# Patient Record
Sex: FEMALE | Race: WHITE | NOT HISPANIC OR LATINO | Employment: UNEMPLOYED | URBAN - METROPOLITAN AREA
[De-identification: names, ages, dates, MRNs, and addresses within clinical notes are randomized per-mention and may not be internally consistent; named-entity substitution may affect disease eponyms.]

---

## 2021-05-13 LAB
ABO + RH BLD: NORMAL
C TRACH RRNA SPEC QL PROBE: NEGATIVE
HBV SURFACE AG SERPL QL IA: NEGATIVE
HCT VFR BLD AUTO: 43.1 % (ref 36–46)
HGB BLD-MCNC: 14.5 G/DL (ref 12–16)
HIV 1+2 AB+HIV1 P24 AG SERPL QL IA: NORMAL
INDIRECT COOMBS: NEGATIVE
N GONORRHOEAE, AMPLIFIED DNA: NEGATIVE
PLATELET # BLD AUTO: 269 K/UL (ref 150–399)
RPR: NORMAL
RUBELLA IMMUNE STATUS: NORMAL

## 2021-06-01 ENCOUNTER — ROUTINE PRENATAL (OUTPATIENT)
Dept: OBSTETRICS AND GYNECOLOGY | Facility: CLINIC | Age: 26
End: 2021-06-01
Payer: MEDICAID

## 2021-06-01 VITALS
WEIGHT: 194.19 LBS | SYSTOLIC BLOOD PRESSURE: 135 MMHG | DIASTOLIC BLOOD PRESSURE: 72 MMHG | HEIGHT: 66 IN | BODY MASS INDEX: 31.21 KG/M2

## 2021-06-01 DIAGNOSIS — Z34.81 ENCOUNTER FOR SUPERVISION OF NORMAL PREGNANCY IN MULTIGRAVIDA IN FIRST TRIMESTER: Primary | ICD-10-CM

## 2021-06-01 DIAGNOSIS — N89.8 VAGINAL DISCHARGE: ICD-10-CM

## 2021-06-01 DIAGNOSIS — R30.0 DYSURIA DURING PREGNANCY IN FIRST TRIMESTER: ICD-10-CM

## 2021-06-01 DIAGNOSIS — R30.0 DYSURIA: ICD-10-CM

## 2021-06-01 DIAGNOSIS — Z34.90 PREGNANCY, UNSPECIFIED GESTATIONAL AGE: ICD-10-CM

## 2021-06-01 DIAGNOSIS — O26.891 DYSURIA DURING PREGNANCY IN FIRST TRIMESTER: ICD-10-CM

## 2021-06-01 LAB
EXT MATERNIT21: NORMAL
GARDNERELLA VAGINALIS: NEGATIVE
OTHER MICROSC. OBSERVATIONS: NORMAL
POC BACTERIAL VAGINOSIS: NEGATIVE
POC CLUE CELLS: NEGATIVE
TRICHOMONAS, POC: NEGATIVE
YEAST WET PREP: NEGATIVE

## 2021-06-01 PROCEDURE — 99213 PR OFFICE/OUTPT VISIT, EST, LEVL III, 20-29 MIN: ICD-10-PCS | Mod: 25,TH,S$GLB, | Performed by: NURSE PRACTITIONER

## 2021-06-01 PROCEDURE — 87210 SMEAR WET MOUNT SALINE/INK: CPT | Mod: QW,S$GLB,, | Performed by: NURSE PRACTITIONER

## 2021-06-01 PROCEDURE — 99213 OFFICE O/P EST LOW 20 MIN: CPT | Mod: 25,TH,S$GLB, | Performed by: NURSE PRACTITIONER

## 2021-06-01 PROCEDURE — 87210 POCT WET PREP: ICD-10-PCS | Mod: QW,S$GLB,, | Performed by: NURSE PRACTITIONER

## 2021-06-01 RX ORDER — ALBUTEROL SULFATE 90 UG/1
AEROSOL, METERED RESPIRATORY (INHALATION)
COMMUNITY
Start: 2021-05-13

## 2021-06-15 ENCOUNTER — ROUTINE PRENATAL (OUTPATIENT)
Dept: OBSTETRICS AND GYNECOLOGY | Facility: CLINIC | Age: 26
End: 2021-06-15
Payer: MEDICAID

## 2021-06-15 VITALS
BODY MASS INDEX: 31.17 KG/M2 | DIASTOLIC BLOOD PRESSURE: 71 MMHG | SYSTOLIC BLOOD PRESSURE: 129 MMHG | WEIGHT: 193.13 LBS

## 2021-06-15 DIAGNOSIS — Z34.81 ENCOUNTER FOR SUPERVISION OF NORMAL PREGNANCY IN MULTIGRAVIDA IN FIRST TRIMESTER: Primary | ICD-10-CM

## 2021-06-15 PROBLEM — Z98.891 HISTORY OF C-SECTION: Status: ACTIVE | Noted: 2021-06-15

## 2021-06-15 PROCEDURE — 99212 OFFICE O/P EST SF 10 MIN: CPT | Mod: TH,S$GLB,, | Performed by: NURSE PRACTITIONER

## 2021-06-15 PROCEDURE — 99212 PR OFFICE/OUTPT VISIT, EST, LEVL II, 10-19 MIN: ICD-10-PCS | Mod: TH,S$GLB,, | Performed by: NURSE PRACTITIONER

## 2021-06-17 ENCOUNTER — PATIENT MESSAGE (OUTPATIENT)
Dept: OBSTETRICS AND GYNECOLOGY | Facility: CLINIC | Age: 26
End: 2021-06-17

## 2021-06-18 LAB
AMORPH URATE CRY URNS QL MICRO: NEGATIVE
BACTERIA #/AREA URNS HPF: NEGATIVE /[HPF]
BILIRUB UR QL STRIP: NEGATIVE
CHLAMYDIA: NEGATIVE
CLARITY UR: CLEAR
COLOR UR: YELLOW
EPITHELIAL CELLS: NORMAL
GLUCOSE (UA): NEGATIVE MG/DL
GONORRHEA: NEGATIVE
KETONES UR QL STRIP: NEGATIVE MG/DL
LEUKOCYTE ESTERASE UR QL STRIP: NEGATIVE
MUCOUS THREADS URNS QL MICRO: NEGATIVE
NITRITE UR QL STRIP: NEGATIVE
OCCULT BLOOD: NEGATIVE
PH, URINE: 6 (ref 5–7.5)
PROT UR QL STRIP: NEGATIVE MG/DL
RBC/HPF: NEGATIVE
SOURCE: NORMAL
SP GR UR STRIP: 1.01 (ref 1–1.03)
URINE CULTURE, ROUTINE: NORMAL
UROBILINOGEN, URINE: NORMAL E.U./DL (ref 0–1)
WBC/HPF: NORMAL

## 2021-06-22 ENCOUNTER — ROUTINE PRENATAL (OUTPATIENT)
Dept: OBSTETRICS AND GYNECOLOGY | Facility: CLINIC | Age: 26
End: 2021-06-22
Payer: MEDICAID

## 2021-06-22 VITALS
WEIGHT: 195.25 LBS | DIASTOLIC BLOOD PRESSURE: 70 MMHG | SYSTOLIC BLOOD PRESSURE: 123 MMHG | BODY MASS INDEX: 31.51 KG/M2

## 2021-06-22 DIAGNOSIS — Z34.81 ENCOUNTER FOR SUPERVISION OF NORMAL PREGNANCY IN MULTIGRAVIDA IN FIRST TRIMESTER: Primary | ICD-10-CM

## 2021-06-22 PROCEDURE — 99212 OFFICE O/P EST SF 10 MIN: CPT | Mod: TH,S$GLB,, | Performed by: NURSE PRACTITIONER

## 2021-06-22 PROCEDURE — 99212 PR OFFICE/OUTPT VISIT, EST, LEVL II, 10-19 MIN: ICD-10-PCS | Mod: TH,S$GLB,, | Performed by: NURSE PRACTITIONER

## 2021-07-20 ENCOUNTER — ROUTINE PRENATAL (OUTPATIENT)
Dept: OBSTETRICS AND GYNECOLOGY | Facility: CLINIC | Age: 26
End: 2021-07-20
Payer: MEDICAID

## 2021-07-20 VITALS
WEIGHT: 194.38 LBS | SYSTOLIC BLOOD PRESSURE: 134 MMHG | HEART RATE: 74 BPM | DIASTOLIC BLOOD PRESSURE: 78 MMHG | BODY MASS INDEX: 31.37 KG/M2

## 2021-07-20 DIAGNOSIS — Z34.82 ENCOUNTER FOR SUPERVISION OF NORMAL PREGNANCY IN MULTIGRAVIDA IN SECOND TRIMESTER: Primary | ICD-10-CM

## 2021-07-20 PROCEDURE — 99213 PR OFFICE/OUTPT VISIT, EST, LEVL III, 20-29 MIN: ICD-10-PCS | Mod: TH,S$GLB,, | Performed by: NURSE PRACTITIONER

## 2021-07-20 PROCEDURE — 99213 OFFICE O/P EST LOW 20 MIN: CPT | Mod: TH,S$GLB,, | Performed by: NURSE PRACTITIONER

## 2021-07-27 ENCOUNTER — PATIENT MESSAGE (OUTPATIENT)
Dept: OBSTETRICS AND GYNECOLOGY | Facility: CLINIC | Age: 26
End: 2021-07-27

## 2021-08-02 ENCOUNTER — PATIENT MESSAGE (OUTPATIENT)
Dept: OBSTETRICS AND GYNECOLOGY | Facility: CLINIC | Age: 26
End: 2021-08-02

## 2021-08-19 ENCOUNTER — ROUTINE PRENATAL (OUTPATIENT)
Dept: OBSTETRICS AND GYNECOLOGY | Facility: CLINIC | Age: 26
End: 2021-08-19
Payer: MEDICAID

## 2021-08-19 ENCOUNTER — PROCEDURE VISIT (OUTPATIENT)
Dept: OBSTETRICS AND GYNECOLOGY | Facility: CLINIC | Age: 26
End: 2021-08-19
Payer: MEDICAID

## 2021-08-19 VITALS
DIASTOLIC BLOOD PRESSURE: 65 MMHG | HEART RATE: 92 BPM | BODY MASS INDEX: 32.02 KG/M2 | SYSTOLIC BLOOD PRESSURE: 124 MMHG | WEIGHT: 198.38 LBS

## 2021-08-19 DIAGNOSIS — Z34.82 ENCOUNTER FOR SUPERVISION OF NORMAL PREGNANCY IN MULTIGRAVIDA IN SECOND TRIMESTER: Primary | ICD-10-CM

## 2021-08-19 DIAGNOSIS — Z34.82 ENCOUNTER FOR SUPERVISION OF NORMAL PREGNANCY IN MULTIGRAVIDA IN SECOND TRIMESTER: ICD-10-CM

## 2021-08-19 PROCEDURE — 99213 OFFICE O/P EST LOW 20 MIN: CPT | Mod: TH,25,S$GLB, | Performed by: NURSE PRACTITIONER

## 2021-08-19 PROCEDURE — 99213 PR OFFICE/OUTPT VISIT, EST, LEVL III, 20-29 MIN: ICD-10-PCS | Mod: TH,25,S$GLB, | Performed by: NURSE PRACTITIONER

## 2021-08-19 PROCEDURE — 76805 PR US, OB 14+WKS, TRANSABD, SINGLE GESTATION: ICD-10-PCS | Mod: S$GLB,,, | Performed by: STUDENT IN AN ORGANIZED HEALTH CARE EDUCATION/TRAINING PROGRAM

## 2021-08-19 PROCEDURE — 76805 OB US >/= 14 WKS SNGL FETUS: CPT | Mod: S$GLB,,, | Performed by: STUDENT IN AN ORGANIZED HEALTH CARE EDUCATION/TRAINING PROGRAM

## 2021-09-09 ENCOUNTER — PATIENT MESSAGE (OUTPATIENT)
Dept: OBSTETRICS AND GYNECOLOGY | Facility: CLINIC | Age: 26
End: 2021-09-09

## 2021-09-16 ENCOUNTER — ROUTINE PRENATAL (OUTPATIENT)
Dept: OBSTETRICS AND GYNECOLOGY | Facility: CLINIC | Age: 26
End: 2021-09-16
Payer: MEDICAID

## 2021-09-16 ENCOUNTER — PROCEDURE VISIT (OUTPATIENT)
Dept: OBSTETRICS AND GYNECOLOGY | Facility: CLINIC | Age: 26
End: 2021-09-16
Payer: MEDICAID

## 2021-09-16 VITALS
BODY MASS INDEX: 32.6 KG/M2 | WEIGHT: 202 LBS | DIASTOLIC BLOOD PRESSURE: 84 MMHG | HEART RATE: 90 BPM | SYSTOLIC BLOOD PRESSURE: 137 MMHG

## 2021-09-16 DIAGNOSIS — Z34.82 ENCOUNTER FOR SUPERVISION OF OTHER NORMAL PREGNANCY IN SECOND TRIMESTER: Primary | ICD-10-CM

## 2021-09-16 DIAGNOSIS — Z3A.26 26 WEEKS GESTATION OF PREGNANCY: ICD-10-CM

## 2021-09-16 DIAGNOSIS — Z34.82 ENCOUNTER FOR SUPERVISION OF NORMAL PREGNANCY IN MULTIGRAVIDA IN SECOND TRIMESTER: ICD-10-CM

## 2021-09-16 PROBLEM — Z34.92 ENCOUNTER FOR SUPERVISION OF NORMAL PREGNANCY IN SECOND TRIMESTER: Status: ACTIVE | Noted: 2021-09-16

## 2021-09-16 PROCEDURE — 99213 PR OFFICE/OUTPT VISIT, EST, LEVL III, 20-29 MIN: ICD-10-PCS | Mod: TH,25,S$GLB, | Performed by: STUDENT IN AN ORGANIZED HEALTH CARE EDUCATION/TRAINING PROGRAM

## 2021-09-16 PROCEDURE — 76805 PR US, OB 14+WKS, TRANSABD, SINGLE GESTATION: ICD-10-PCS | Mod: S$GLB,,, | Performed by: STUDENT IN AN ORGANIZED HEALTH CARE EDUCATION/TRAINING PROGRAM

## 2021-09-16 PROCEDURE — 76805 OB US >/= 14 WKS SNGL FETUS: CPT | Mod: S$GLB,,, | Performed by: STUDENT IN AN ORGANIZED HEALTH CARE EDUCATION/TRAINING PROGRAM

## 2021-09-16 PROCEDURE — 99213 OFFICE O/P EST LOW 20 MIN: CPT | Mod: TH,25,S$GLB, | Performed by: STUDENT IN AN ORGANIZED HEALTH CARE EDUCATION/TRAINING PROGRAM

## 2021-09-21 ENCOUNTER — PATIENT MESSAGE (OUTPATIENT)
Dept: OBSTETRICS AND GYNECOLOGY | Facility: CLINIC | Age: 26
End: 2021-09-21

## 2021-09-30 ENCOUNTER — ROUTINE PRENATAL (OUTPATIENT)
Dept: OBSTETRICS AND GYNECOLOGY | Facility: CLINIC | Age: 26
End: 2021-09-30
Payer: MEDICAID

## 2021-09-30 ENCOUNTER — PATIENT MESSAGE (OUTPATIENT)
Dept: OBSTETRICS AND GYNECOLOGY | Facility: CLINIC | Age: 26
End: 2021-09-30

## 2021-09-30 VITALS
SYSTOLIC BLOOD PRESSURE: 127 MMHG | HEART RATE: 101 BPM | WEIGHT: 208 LBS | BODY MASS INDEX: 33.57 KG/M2 | DIASTOLIC BLOOD PRESSURE: 77 MMHG

## 2021-09-30 DIAGNOSIS — R30.0 DYSURIA: ICD-10-CM

## 2021-09-30 DIAGNOSIS — Z34.83 ENCOUNTER FOR SUPERVISION OF NORMAL PREGNANCY IN MULTIGRAVIDA IN THIRD TRIMESTER: Primary | ICD-10-CM

## 2021-09-30 LAB
AMORPH URATE CRY URNS QL MICRO: NEGATIVE
BACTERIA #/AREA URNS HPF: ABNORMAL /[HPF]
BILIRUB UR QL STRIP: NEGATIVE
CLARITY UR: ABNORMAL
COLOR UR: YELLOW
EPITHELIAL CELLS: ABNORMAL
GLUCOSE (UA): NEGATIVE MG/DL
KETONES UR QL STRIP: NEGATIVE MG/DL
LEUKOCYTE ESTERASE UR QL STRIP: ABNORMAL
MUCOUS THREADS URNS QL MICRO: ABNORMAL
NITRITE UR QL STRIP: NEGATIVE
OCCULT BLOOD: ABNORMAL
PH, URINE: 6.5 (ref 5–7.5)
PROT UR QL STRIP: NEGATIVE MG/DL
RBC/HPF: ABNORMAL
SP GR UR STRIP: 1.01 (ref 1–1.03)
UROBILINOGEN, URINE: NORMAL E.U./DL (ref 0–1)
WBC/HPF: ABNORMAL

## 2021-09-30 PROCEDURE — 90471 TDAP VACCINE GREATER THAN OR EQUAL TO 7YO IM: ICD-10-PCS | Mod: S$GLB,,, | Performed by: NURSE PRACTITIONER

## 2021-09-30 PROCEDURE — 90715 TDAP VACCINE GREATER THAN OR EQUAL TO 7YO IM: ICD-10-PCS | Mod: S$GLB,,, | Performed by: NURSE PRACTITIONER

## 2021-09-30 PROCEDURE — 90471 IMMUNIZATION ADMIN: CPT | Mod: S$GLB,,, | Performed by: NURSE PRACTITIONER

## 2021-09-30 PROCEDURE — 90715 TDAP VACCINE 7 YRS/> IM: CPT | Mod: S$GLB,,, | Performed by: NURSE PRACTITIONER

## 2021-09-30 PROCEDURE — 99213 OFFICE O/P EST LOW 20 MIN: CPT | Mod: 25,TH,S$GLB, | Performed by: NURSE PRACTITIONER

## 2021-09-30 PROCEDURE — 99213 PR OFFICE/OUTPT VISIT, EST, LEVL III, 20-29 MIN: ICD-10-PCS | Mod: 25,TH,S$GLB, | Performed by: NURSE PRACTITIONER

## 2021-10-08 LAB
ABS NRBC COUNT: 0 X 10 3/UL (ref 0–0.01)
ABSOLUTE BASOPHIL: 0.03 X 10 3/UL (ref 0–0.22)
ABSOLUTE EOSINOPHIL: 0.14 X 10 3/UL (ref 0.04–0.54)
ABSOLUTE IMMATURE GRAN: 0.04 X 10 3/UL (ref 0–0.04)
ABSOLUTE LYMPHOCYTE: 1.54 X 10 3/UL (ref 0.86–4.75)
ABSOLUTE MONOCYTE: 0.4 X 10 3/UL (ref 0.22–1.08)
BASOPHILS NFR BLD: 0.3 % (ref 0.2–1.2)
EOSINOPHIL NFR BLD: 1.5 % (ref 0.7–7)
GLUCOSE 1 HR POST 50 GM: 144 MG/DL
HCT VFR BLD AUTO: 37.4 % (ref 37–47)
HGB BLD-MCNC: 12.8 G/DL (ref 12–16)
HIV 1+2 AB+HIV1 P24 AG SERPL QL IA: NONREACTIVE
IMMATURE GRANULOCYTES: 0.4 % (ref 0–0.5)
LYMPHOCYTES NFR BLD: 16.3 % (ref 19.3–53.1)
MCH RBC QN AUTO: 30.5 PG (ref 27–32)
MCHC RBC AUTO-ENTMCNC: 34.2 G/DL (ref 32–36)
MCV RBC AUTO: 89 FL (ref 82–100)
MONOCYTES NFR BLD: 4.2 % (ref 4.7–12.5)
NEUTROPHILS # BLD AUTO: 7.27 X 10 3/UL (ref 2.15–7.56)
NEUTROPHILS NFR BLD: 77.3 %
NUCLEATED RED BLOOD CELLS: 0 /100 WBC (ref 0–0.2)
PLATELET # BLD AUTO: 188 X 10 3/UL (ref 135–400)
RBC # BLD AUTO: 4.2 X 10 6/UL (ref 4.2–5.4)
RDW-SD: 41.1 FL (ref 37–54)
WBC # BLD: 9.42 X 10 3/UL (ref 4.3–10.8)

## 2021-10-11 DIAGNOSIS — Z34.83 ENCOUNTER FOR SUPERVISION OF NORMAL PREGNANCY IN MULTIGRAVIDA IN THIRD TRIMESTER: ICD-10-CM

## 2021-10-11 PROBLEM — O99.810 ABNORMAL O'SULLIVAN GLUCOSE CHALLENGE TEST, ANTEPARTUM: Status: ACTIVE | Noted: 2021-10-11

## 2021-10-12 LAB
GLUCOSE, 1 HOUR: 165 MG/DL
GLUCOSE, 2 HOUR: 95 MG/DL
GLUCOSE, 3 HOUR: 68 MG/DL
GLUCOSE, FASTING: 89 MG/DL (ref 74–109)

## 2021-10-14 ENCOUNTER — ROUTINE PRENATAL (OUTPATIENT)
Dept: OBSTETRICS AND GYNECOLOGY | Facility: CLINIC | Age: 26
End: 2021-10-14
Payer: MEDICAID

## 2021-10-14 VITALS — SYSTOLIC BLOOD PRESSURE: 125 MMHG | BODY MASS INDEX: 33.89 KG/M2 | DIASTOLIC BLOOD PRESSURE: 73 MMHG | WEIGHT: 210 LBS

## 2021-10-14 DIAGNOSIS — Z34.83 ENCOUNTER FOR SUPERVISION OF NORMAL PREGNANCY IN MULTIGRAVIDA IN THIRD TRIMESTER: Primary | ICD-10-CM

## 2021-10-14 DIAGNOSIS — B96.89 BV (BACTERIAL VAGINOSIS): ICD-10-CM

## 2021-10-14 DIAGNOSIS — N76.0 BV (BACTERIAL VAGINOSIS): ICD-10-CM

## 2021-10-14 PROCEDURE — 99213 OFFICE O/P EST LOW 20 MIN: CPT | Mod: TH,S$GLB,, | Performed by: NURSE PRACTITIONER

## 2021-10-14 PROCEDURE — 99213 PR OFFICE/OUTPT VISIT, EST, LEVL III, 20-29 MIN: ICD-10-PCS | Mod: TH,S$GLB,, | Performed by: NURSE PRACTITIONER

## 2021-10-14 RX ORDER — METRONIDAZOLE 500 MG/1
500 TABLET ORAL 2 TIMES DAILY
Qty: 14 TABLET | Refills: 0 | Status: SHIPPED | OUTPATIENT
Start: 2021-10-14 | End: 2021-10-21

## 2021-10-25 ENCOUNTER — PATIENT MESSAGE (OUTPATIENT)
Dept: OBSTETRICS AND GYNECOLOGY | Facility: CLINIC | Age: 26
End: 2021-10-25
Payer: MEDICAID

## 2021-10-26 ENCOUNTER — PATIENT MESSAGE (OUTPATIENT)
Dept: OBSTETRICS AND GYNECOLOGY | Facility: CLINIC | Age: 26
End: 2021-10-26
Payer: MEDICAID

## 2021-10-26 LAB
APPEARANCE, UA: CLEAR
BILIRUB UR QL STRIP: NEGATIVE MG/DL
COLOR UR: NORMAL
GLUCOSE (UA): NORMAL MG/DL
HGB UR QL STRIP: NEGATIVE /UL
KETONES UR QL STRIP: NEGATIVE MG/DL
LEUKOCYTE ESTERASE UR QL STRIP: NEGATIVE /UL
NITRITE UR QL STRIP: NEGATIVE
PH UR STRIP: 7 PH (ref 5–9)
PROT UR QL STRIP: NEGATIVE MG/DL
SP GR UR STRIP: 1.01 (ref 1–1.03)
SPECIMEN COLLECTION METHOD, URINE: NORMAL
UROBILINOGEN UR STRIP-ACNC: NORMAL MG/DL

## 2021-10-28 ENCOUNTER — ROUTINE PRENATAL (OUTPATIENT)
Dept: OBSTETRICS AND GYNECOLOGY | Facility: CLINIC | Age: 26
End: 2021-10-28
Payer: MEDICAID

## 2021-10-28 VITALS
SYSTOLIC BLOOD PRESSURE: 129 MMHG | BODY MASS INDEX: 33.89 KG/M2 | HEART RATE: 98 BPM | WEIGHT: 210 LBS | DIASTOLIC BLOOD PRESSURE: 77 MMHG

## 2021-10-28 DIAGNOSIS — Z34.83 ENCOUNTER FOR SUPERVISION OF NORMAL PREGNANCY IN MULTIGRAVIDA IN THIRD TRIMESTER: Primary | ICD-10-CM

## 2021-10-28 PROCEDURE — 99213 PR OFFICE/OUTPT VISIT, EST, LEVL III, 20-29 MIN: ICD-10-PCS | Mod: TH,S$GLB,, | Performed by: NURSE PRACTITIONER

## 2021-10-28 PROCEDURE — 99213 OFFICE O/P EST LOW 20 MIN: CPT | Mod: TH,S$GLB,, | Performed by: NURSE PRACTITIONER

## 2021-11-11 ENCOUNTER — ROUTINE PRENATAL (OUTPATIENT)
Dept: OBSTETRICS AND GYNECOLOGY | Facility: CLINIC | Age: 26
End: 2021-11-11
Payer: MEDICAID

## 2021-11-11 VITALS
BODY MASS INDEX: 33.73 KG/M2 | DIASTOLIC BLOOD PRESSURE: 79 MMHG | HEART RATE: 88 BPM | SYSTOLIC BLOOD PRESSURE: 126 MMHG | WEIGHT: 209 LBS

## 2021-11-11 DIAGNOSIS — Z3A.34 34 WEEKS GESTATION OF PREGNANCY: Primary | ICD-10-CM

## 2021-11-11 PROCEDURE — 99213 PR OFFICE/OUTPT VISIT, EST, LEVL III, 20-29 MIN: ICD-10-PCS | Mod: TH,S$GLB,, | Performed by: NURSE PRACTITIONER

## 2021-11-11 PROCEDURE — 99213 OFFICE O/P EST LOW 20 MIN: CPT | Mod: TH,S$GLB,, | Performed by: NURSE PRACTITIONER

## 2021-11-17 ENCOUNTER — PATIENT MESSAGE (OUTPATIENT)
Dept: OBSTETRICS AND GYNECOLOGY | Facility: CLINIC | Age: 26
End: 2021-11-17
Payer: MEDICAID

## 2021-11-23 ENCOUNTER — ROUTINE PRENATAL (OUTPATIENT)
Dept: OBSTETRICS AND GYNECOLOGY | Facility: CLINIC | Age: 26
End: 2021-11-23
Payer: MEDICAID

## 2021-11-23 VITALS
WEIGHT: 209 LBS | DIASTOLIC BLOOD PRESSURE: 82 MMHG | BODY MASS INDEX: 33.73 KG/M2 | SYSTOLIC BLOOD PRESSURE: 130 MMHG | HEART RATE: 98 BPM

## 2021-11-23 DIAGNOSIS — O16.3 ELEVATED BLOOD PRESSURE AFFECTING PREGNANCY IN THIRD TRIMESTER, ANTEPARTUM: ICD-10-CM

## 2021-11-23 DIAGNOSIS — Z3A.35 35 WEEKS GESTATION OF PREGNANCY: ICD-10-CM

## 2021-11-23 DIAGNOSIS — Z34.83 ENCOUNTER FOR SUPERVISION OF OTHER NORMAL PREGNANCY IN THIRD TRIMESTER: Primary | ICD-10-CM

## 2021-11-23 LAB
ABS NRBC COUNT: 0 X 10 3/UL (ref 0–0.01)
ABSOLUTE BASOPHIL: 0.04 X 10 3/UL (ref 0–0.22)
ABSOLUTE EOSINOPHIL: 0.17 X 10 3/UL (ref 0.04–0.54)
ABSOLUTE IMMATURE GRAN: 0.04 X 10 3/UL (ref 0–0.04)
ABSOLUTE LYMPHOCYTE: 1.67 X 10 3/UL (ref 0.86–4.75)
ABSOLUTE MONOCYTE: 0.69 X 10 3/UL (ref 0.22–1.08)
ALBUMIN SERPL-MCNC: 4 G/DL (ref 3.5–5.2)
ALBUMIN/GLOB SERPL ELPH: 1.3 {RATIO} (ref 1–2.7)
ALP ISOS SERPL LEV INH-CCNC: 114 U/L (ref 35–105)
ALT (SGPT): 6 U/L (ref 0–33)
ANION GAP SERPL CALC-SCNC: 11 MMOL/L (ref 8–17)
AST SERPL-CCNC: 8 U/L (ref 0–32)
BASOPHILS NFR BLD: 0.4 % (ref 0.2–1.2)
BILIRUBIN, TOTAL: 0.3 MG/DL (ref 0–1.2)
BUN/CREAT SERPL: 9 (ref 6–20)
CALCIUM SERPL-MCNC: 9 MG/DL (ref 8.6–10.2)
CARBON DIOXIDE, CO2: 20 MMOL/L (ref 22–29)
CHLORIDE: 105 MMOL/L (ref 98–107)
CREAT SERPL-MCNC: 0.58 MG/DL (ref 0.5–0.9)
CREATININE RANDOM URINE: 70.1 MG/DL (ref 28–217)
EOSINOPHIL NFR BLD: 1.7 % (ref 0.7–7)
GFR ESTIMATION: 124.7
GLOBULIN: 3 G/DL (ref 1.5–4.5)
GLUCOSE: 87 MG/DL (ref 74–106)
HCT VFR BLD AUTO: 37.4 % (ref 37–47)
HGB BLD-MCNC: 12.9 G/DL (ref 12–16)
IMMATURE GRANULOCYTES: 0.4 % (ref 0–0.5)
LDH SERPL L TO P-CCNC: 131 U/L (ref 135–214)
LYMPHOCYTES NFR BLD: 17.2 % (ref 19.3–53.1)
MCH RBC QN AUTO: 29.9 PG (ref 27–32)
MCHC RBC AUTO-ENTMCNC: 34.5 G/DL (ref 32–36)
MCV RBC AUTO: 86.8 FL (ref 82–100)
MONOCYTES NFR BLD: 7.1 % (ref 4.7–12.5)
NEUTROPHILS # BLD AUTO: 7.12 X 10 3/UL (ref 2.15–7.56)
NEUTROPHILS NFR BLD: 73.2 % (ref 34–71.1)
NUCLEATED RED BLOOD CELLS: 0 /100 WBC (ref 0–0.2)
PLATELET # BLD AUTO: 202 X 10 3/UL (ref 135–400)
POTASSIUM: 4.1 MMOL/L (ref 3.5–5.1)
PROT SNV-MCNC: 7 G/DL (ref 6.4–8.3)
PROT UR QL STRIP: 8.52 MG/DL (ref 5–24)
PROTEIN/CREATININE RATIO: 0.12
RBC # BLD AUTO: 4.31 X 10 6/UL (ref 4.2–5.4)
RDW-SD: 40.6 FL (ref 37–54)
SODIUM: 136 MMOL/L (ref 136–145)
URATE SERPL-MCNC: 3 MG/DL (ref 2.4–5.7)
UREA NITROGEN (BUN): 5.2 MG/DL (ref 6–20)
WBC # BLD: 9.73 X 10 3/UL (ref 4.3–10.8)

## 2021-11-23 PROCEDURE — 99213 OFFICE O/P EST LOW 20 MIN: CPT | Mod: TH,S$GLB,, | Performed by: STUDENT IN AN ORGANIZED HEALTH CARE EDUCATION/TRAINING PROGRAM

## 2021-11-23 PROCEDURE — 99213 PR OFFICE/OUTPT VISIT, EST, LEVL III, 20-29 MIN: ICD-10-PCS | Mod: TH,S$GLB,, | Performed by: STUDENT IN AN ORGANIZED HEALTH CARE EDUCATION/TRAINING PROGRAM

## 2021-11-24 ENCOUNTER — PATIENT MESSAGE (OUTPATIENT)
Dept: OBSTETRICS AND GYNECOLOGY | Facility: CLINIC | Age: 26
End: 2021-11-24
Payer: MEDICAID

## 2021-11-30 ENCOUNTER — ROUTINE PRENATAL (OUTPATIENT)
Dept: OBSTETRICS AND GYNECOLOGY | Facility: CLINIC | Age: 26
End: 2021-11-30
Payer: MEDICAID

## 2021-11-30 VITALS
HEART RATE: 93 BPM | BODY MASS INDEX: 34.12 KG/M2 | SYSTOLIC BLOOD PRESSURE: 141 MMHG | DIASTOLIC BLOOD PRESSURE: 79 MMHG | WEIGHT: 211.38 LBS

## 2021-11-30 DIAGNOSIS — Z98.891 HISTORY OF C-SECTION: ICD-10-CM

## 2021-11-30 DIAGNOSIS — Z3A.36 36 WEEKS GESTATION OF PREGNANCY: ICD-10-CM

## 2021-11-30 DIAGNOSIS — O13.3 GESTATIONAL HYPERTENSION, THIRD TRIMESTER: ICD-10-CM

## 2021-11-30 DIAGNOSIS — O09.93 SUPERVISION OF HIGH RISK PREGNANCY IN THIRD TRIMESTER: Primary | ICD-10-CM

## 2021-11-30 LAB
ABS NRBC COUNT: 0 X 10 3/UL (ref 0–0.01)
ABSOLUTE BASOPHIL: 0.03 X 10 3/UL (ref 0–0.22)
ABSOLUTE EOSINOPHIL: 0.07 X 10 3/UL (ref 0.04–0.54)
ABSOLUTE IMMATURE GRAN: 0.04 X 10 3/UL (ref 0–0.04)
ABSOLUTE LYMPHOCYTE: 1.42 X 10 3/UL (ref 0.86–4.75)
ABSOLUTE MONOCYTE: 0.51 X 10 3/UL (ref 0.22–1.08)
ALBUMIN SERPL-MCNC: 4 G/DL (ref 3.5–5.2)
ALBUMIN/GLOB SERPL ELPH: 1.4 {RATIO} (ref 1–2.7)
ALP ISOS SERPL LEV INH-CCNC: 120 U/L (ref 35–105)
ALT (SGPT): 6 U/L (ref 0–33)
ANION GAP SERPL CALC-SCNC: 11 MMOL/L (ref 8–17)
AST SERPL-CCNC: 9 U/L (ref 0–32)
BASOPHILS NFR BLD: 0.3 % (ref 0.2–1.2)
BILIRUBIN, TOTAL: 0.4 MG/DL (ref 0–1.2)
BUN/CREAT SERPL: 6.7 (ref 6–20)
CALCIUM SERPL-MCNC: 9.2 MG/DL (ref 8.6–10.2)
CARBON DIOXIDE, CO2: 22 MMOL/L (ref 22–29)
CHLORIDE: 104 MMOL/L (ref 98–107)
CREAT SERPL-MCNC: 0.7 MG/DL (ref 0.5–0.9)
CREATININE RANDOM URINE: 167.5 MG/DL (ref 28–217)
EOSINOPHIL NFR BLD: 0.7 % (ref 0.7–7)
GFR ESTIMATION: 100.38
GLOBULIN: 2.9 G/DL (ref 1.5–4.5)
GLUCOSE: 111 MG/DL (ref 74–106)
HCT VFR BLD AUTO: 37.1 % (ref 37–47)
HGB BLD-MCNC: 12.8 G/DL (ref 12–16)
IMMATURE GRANULOCYTES: 0.4 % (ref 0–0.5)
LDH SERPL L TO P-CCNC: 137 U/L (ref 135–214)
LYMPHOCYTES NFR BLD: 14.9 % (ref 19.3–53.1)
MCH RBC QN AUTO: 29.8 PG (ref 27–32)
MCHC RBC AUTO-ENTMCNC: 34.5 G/DL (ref 32–36)
MCV RBC AUTO: 86.5 FL (ref 82–100)
MONOCYTES NFR BLD: 5.4 % (ref 4.7–12.5)
NEUTROPHILS # BLD AUTO: 7.43 X 10 3/UL (ref 2.15–7.56)
NEUTROPHILS NFR BLD: 78.3 % (ref 34–71.1)
NUCLEATED RED BLOOD CELLS: 0 /100 WBC (ref 0–0.2)
PLATELET # BLD AUTO: 185 X 10 3/UL (ref 135–400)
POTASSIUM: 3.8 MMOL/L (ref 3.5–5.1)
PROT SNV-MCNC: 6.9 G/DL (ref 6.4–8.3)
PROT UR QL STRIP: 17.17 MG/DL (ref 5–24)
PROTEIN/CREATININE RATIO: 0.1
RBC # BLD AUTO: 4.29 X 10 6/UL (ref 4.2–5.4)
RDW-SD: 40.1 FL (ref 37–54)
SODIUM: 137 MMOL/L (ref 136–145)
URATE SERPL-MCNC: 3.3 MG/DL (ref 2.4–5.7)
UREA NITROGEN (BUN): 4.7 MG/DL (ref 6–20)
WBC # BLD: 9.5 X 10 3/UL (ref 4.3–10.8)

## 2021-11-30 PROCEDURE — 99213 PR OFFICE/OUTPT VISIT, EST, LEVL III, 20-29 MIN: ICD-10-PCS | Mod: TH,S$GLB,, | Performed by: STUDENT IN AN ORGANIZED HEALTH CARE EDUCATION/TRAINING PROGRAM

## 2021-11-30 PROCEDURE — 99213 OFFICE O/P EST LOW 20 MIN: CPT | Mod: TH,S$GLB,, | Performed by: STUDENT IN AN ORGANIZED HEALTH CARE EDUCATION/TRAINING PROGRAM

## 2021-12-02 ENCOUNTER — OUTSIDE PLACE OF SERVICE (OUTPATIENT)
Dept: OBSTETRICS AND GYNECOLOGY | Facility: CLINIC | Age: 26
End: 2021-12-02
Payer: MEDICAID

## 2021-12-02 LAB
APPEARANCE, UA: CLEAR
BILIRUB UR QL STRIP: NEGATIVE MG/DL
COLOR UR: NORMAL
ERYTHROCYTE [DISTWIDTH] IN BLOOD BY AUTOMATED COUNT: 12.7 % (ref 12.5–18)
GLUCOSE (UA): NORMAL MG/DL
HCT VFR BLD AUTO: 36.3 % (ref 37–47)
HGB BLD-MCNC: 12.7 G/DL (ref 12–16)
HGB UR QL STRIP: NEGATIVE /UL
KETONES UR QL STRIP: NEGATIVE MG/DL
LEUKOCYTE ESTERASE UR QL STRIP: NEGATIVE /UL
MCH RBC QN AUTO: 30.3 PG (ref 27–31.2)
MCHC RBC AUTO-ENTMCNC: 35 G/DL (ref 31.8–35.4)
MCV RBC AUTO: 86.6 FL (ref 80–97)
NITRITE UR QL STRIP: NEGATIVE
NUCLEATED RED BLOOD CELLS: 0 %
PH UR STRIP: 6.5 PH (ref 5–9)
PLATELETS: 183 10*3/UL (ref 142–424)
PROT UR QL STRIP: NEGATIVE MG/DL
RBC # BLD AUTO: 4.19 10*6/UL (ref 4.2–5.4)
RPR: NON REACTIVE
SP GR UR STRIP: 1.01 (ref 1–1.03)
SPECIMEN COLLECTION METHOD, URINE: NORMAL
UROBILINOGEN UR STRIP-ACNC: NORMAL MG/DL
WBC # BLD: 8.6 10*3/UL (ref 4.6–10.2)

## 2021-12-02 PROCEDURE — 0502F PR SUBSEQUENT PRENATAL CARE: ICD-10-PCS | Mod: ,,, | Performed by: NURSE PRACTITIONER

## 2021-12-02 PROCEDURE — 59514 PR CESAREAN DELIVERY ONLY: ICD-10-PCS | Mod: AT,,, | Performed by: STUDENT IN AN ORGANIZED HEALTH CARE EDUCATION/TRAINING PROGRAM

## 2021-12-02 PROCEDURE — 0502F SUBSEQUENT PRENATAL CARE: CPT | Mod: ,,, | Performed by: NURSE PRACTITIONER

## 2021-12-02 PROCEDURE — 59514 CESAREAN DELIVERY ONLY: CPT | Mod: AT,,, | Performed by: STUDENT IN AN ORGANIZED HEALTH CARE EDUCATION/TRAINING PROGRAM

## 2021-12-03 LAB
ERYTHROCYTE [DISTWIDTH] IN BLOOD BY AUTOMATED COUNT: 13.1 % (ref 12.5–18)
HCT VFR BLD AUTO: 33.3 % (ref 37–47)
HGB BLD-MCNC: 11.4 G/DL (ref 12–16)
MCH RBC QN AUTO: 30.4 PG (ref 27–31.2)
MCHC RBC AUTO-ENTMCNC: 34.2 G/DL (ref 31.8–35.4)
MCV RBC AUTO: 88.8 FL (ref 80–97)
NUCLEATED RED BLOOD CELLS: 0 %
PLATELETS: 195 10*3/UL (ref 142–424)
RBC # BLD AUTO: 3.75 10*6/UL (ref 4.2–5.4)
WBC # BLD: 11.3 10*3/UL (ref 4.6–10.2)

## 2021-12-03 PROCEDURE — 99231 PR SUBSEQUENT HOSPITAL CARE,LEVL I: ICD-10-PCS | Mod: ,,, | Performed by: STUDENT IN AN ORGANIZED HEALTH CARE EDUCATION/TRAINING PROGRAM

## 2021-12-03 PROCEDURE — 99231 SBSQ HOSP IP/OBS SF/LOW 25: CPT | Mod: ,,, | Performed by: STUDENT IN AN ORGANIZED HEALTH CARE EDUCATION/TRAINING PROGRAM

## 2021-12-09 ENCOUNTER — POSTPARTUM VISIT (OUTPATIENT)
Dept: OBSTETRICS AND GYNECOLOGY | Facility: CLINIC | Age: 26
End: 2021-12-09
Payer: MEDICAID

## 2021-12-09 VITALS
WEIGHT: 195 LBS | BODY MASS INDEX: 31.47 KG/M2 | SYSTOLIC BLOOD PRESSURE: 125 MMHG | DIASTOLIC BLOOD PRESSURE: 81 MMHG | HEART RATE: 86 BPM

## 2021-12-09 DIAGNOSIS — O13.3 GESTATIONAL HYPERTENSION, THIRD TRIMESTER: ICD-10-CM

## 2021-12-09 DIAGNOSIS — Z98.891 S/P C-SECTION: Primary | ICD-10-CM

## 2021-12-09 PROBLEM — O13.9 GESTATIONAL HYPERTENSION: Status: ACTIVE | Noted: 2021-12-09

## 2021-12-09 PROCEDURE — 0503F PR POSTPARTUM CARE VISIT: ICD-10-PCS | Mod: S$GLB,,, | Performed by: STUDENT IN AN ORGANIZED HEALTH CARE EDUCATION/TRAINING PROGRAM

## 2021-12-09 PROCEDURE — 0503F POSTPARTUM CARE VISIT: CPT | Mod: S$GLB,,, | Performed by: STUDENT IN AN ORGANIZED HEALTH CARE EDUCATION/TRAINING PROGRAM

## 2021-12-12 ENCOUNTER — PATIENT MESSAGE (OUTPATIENT)
Dept: OBSTETRICS AND GYNECOLOGY | Facility: CLINIC | Age: 26
End: 2021-12-12
Payer: MEDICAID

## 2021-12-14 ENCOUNTER — PATIENT MESSAGE (OUTPATIENT)
Dept: OBSTETRICS AND GYNECOLOGY | Facility: CLINIC | Age: 26
End: 2021-12-14
Payer: MEDICAID

## 2021-12-15 DIAGNOSIS — Z98.891 S/P C-SECTION: Primary | ICD-10-CM

## 2021-12-15 RX ORDER — HYDROCODONE BITARTRATE AND ACETAMINOPHEN 5; 325 MG/1; MG/1
1 TABLET ORAL EVERY 4 HOURS PRN
Qty: 18 TABLET | Refills: 0 | Status: SHIPPED | OUTPATIENT
Start: 2021-12-15 | End: 2022-05-02

## 2021-12-30 ENCOUNTER — PATIENT MESSAGE (OUTPATIENT)
Dept: OBSTETRICS AND GYNECOLOGY | Facility: CLINIC | Age: 26
End: 2021-12-30
Payer: MEDICAID

## 2021-12-30 DIAGNOSIS — F41.9 ANXIETY: Primary | ICD-10-CM

## 2021-12-30 RX ORDER — SERTRALINE HYDROCHLORIDE 50 MG/1
50 TABLET, FILM COATED ORAL DAILY
Qty: 30 TABLET | Refills: 2 | Status: SHIPPED | OUTPATIENT
Start: 2021-12-30 | End: 2022-04-04

## 2022-01-13 ENCOUNTER — POSTPARTUM VISIT (OUTPATIENT)
Dept: OBSTETRICS AND GYNECOLOGY | Facility: CLINIC | Age: 27
End: 2022-01-13
Payer: MEDICAID

## 2022-01-13 VITALS
HEART RATE: 74 BPM | WEIGHT: 195 LBS | SYSTOLIC BLOOD PRESSURE: 123 MMHG | DIASTOLIC BLOOD PRESSURE: 81 MMHG | BODY MASS INDEX: 31.47 KG/M2

## 2022-01-13 DIAGNOSIS — Z98.891 S/P C-SECTION: ICD-10-CM

## 2022-01-13 DIAGNOSIS — F41.9 ANXIETY: ICD-10-CM

## 2022-01-13 PROCEDURE — 59430 PR CARE AFTER DELIVERY ONLY: ICD-10-PCS | Mod: S$GLB,,, | Performed by: STUDENT IN AN ORGANIZED HEALTH CARE EDUCATION/TRAINING PROGRAM

## 2022-01-13 NOTE — PROGRESS NOTES
Pt is a 25 yo WF  s/p rpt C/s presenting to clinic for 6 week PP visit. She has no complaints today. Pt reports LMP last week. She denies VB/VD/dysuria/Denies any HA, vision changes, F/C, LE swelling. Denies any breast pain/soreness. Denies postpartum depression. Pt is breast feeding without issues. Pt thinking about starting birth control, but is unsure at this time.  She was started on Zoloft by his small for increased anxiety doing well with her anxiety at this time.    Vitals:    22 1100   BP: 123/81   Pulse: 74   Weight: 88.5 kg (195 lb)     Body mass index is 31.47 kg/m².  Gen:WDWN in NAD  NC/AT  Non labored breathing   Abd soft, NT/ND, incision well healed  Skin: warm and dry  Ext no edema    Patient Active Problem List   Diagnosis    History of     Encounter for supervision of normal pregnancy in second trimester    Abnormal O'Paz glucose challenge test, antepartum    Gestational hypertension    S/P      Plan   Pt doing well  Released from post op restrictions   Discussed contraception and reviewed different types, pt to think about it  Patient continue her Zoloft as prescribed  RTC 1 yr annual /PRN

## 2022-01-23 ENCOUNTER — PATIENT MESSAGE (OUTPATIENT)
Dept: OBSTETRICS AND GYNECOLOGY | Facility: CLINIC | Age: 27
End: 2022-01-23
Payer: MEDICAID

## 2022-01-24 DIAGNOSIS — O92.4 DECREASED LACTATION: Primary | ICD-10-CM

## 2022-01-24 RX ORDER — METOCLOPRAMIDE 10 MG/1
10 TABLET ORAL 2 TIMES DAILY
Qty: 24 TABLET | Refills: 0 | Status: SHIPPED | OUTPATIENT
Start: 2022-01-24 | End: 2022-02-05

## 2022-04-01 ENCOUNTER — PATIENT MESSAGE (OUTPATIENT)
Dept: OBSTETRICS AND GYNECOLOGY | Facility: CLINIC | Age: 27
End: 2022-04-01
Payer: MEDICAID

## 2022-04-04 DIAGNOSIS — F41.1 GAD (GENERALIZED ANXIETY DISORDER): Primary | ICD-10-CM

## 2022-04-04 RX ORDER — SERTRALINE HYDROCHLORIDE 100 MG/1
100 TABLET, FILM COATED ORAL DAILY
Qty: 30 TABLET | Refills: 3 | Status: SHIPPED | OUTPATIENT
Start: 2022-04-04 | End: 2022-09-12 | Stop reason: SDUPTHER

## 2022-05-02 ENCOUNTER — OFFICE VISIT (OUTPATIENT)
Dept: OBSTETRICS AND GYNECOLOGY | Facility: CLINIC | Age: 27
End: 2022-05-02
Payer: MEDICAID

## 2022-05-02 VITALS
HEART RATE: 97 BPM | WEIGHT: 219 LBS | BODY MASS INDEX: 35.35 KG/M2 | SYSTOLIC BLOOD PRESSURE: 122 MMHG | DIASTOLIC BLOOD PRESSURE: 81 MMHG

## 2022-05-02 DIAGNOSIS — Z12.4 SCREENING FOR MALIGNANT NEOPLASM OF THE CERVIX: ICD-10-CM

## 2022-05-02 DIAGNOSIS — Z01.419 WELL WOMAN EXAM WITH ROUTINE GYNECOLOGICAL EXAM: Primary | ICD-10-CM

## 2022-05-02 PROCEDURE — 99395 PR PREVENTIVE VISIT,EST,18-39: ICD-10-PCS | Mod: S$GLB,,, | Performed by: NURSE PRACTITIONER

## 2022-05-02 PROCEDURE — 1159F MED LIST DOCD IN RCRD: CPT | Mod: CPTII,S$GLB,, | Performed by: NURSE PRACTITIONER

## 2022-05-02 PROCEDURE — 3008F BODY MASS INDEX DOCD: CPT | Mod: CPTII,S$GLB,, | Performed by: NURSE PRACTITIONER

## 2022-05-02 PROCEDURE — 3079F PR MOST RECENT DIASTOLIC BLOOD PRESSURE 80-89 MM HG: ICD-10-PCS | Mod: CPTII,S$GLB,, | Performed by: NURSE PRACTITIONER

## 2022-05-02 PROCEDURE — 3008F PR BODY MASS INDEX (BMI) DOCUMENTED: ICD-10-PCS | Mod: CPTII,S$GLB,, | Performed by: NURSE PRACTITIONER

## 2022-05-02 PROCEDURE — 99395 PREV VISIT EST AGE 18-39: CPT | Mod: S$GLB,,, | Performed by: NURSE PRACTITIONER

## 2022-05-02 PROCEDURE — 3074F SYST BP LT 130 MM HG: CPT | Mod: CPTII,S$GLB,, | Performed by: NURSE PRACTITIONER

## 2022-05-02 PROCEDURE — 3074F PR MOST RECENT SYSTOLIC BLOOD PRESSURE < 130 MM HG: ICD-10-PCS | Mod: CPTII,S$GLB,, | Performed by: NURSE PRACTITIONER

## 2022-05-02 PROCEDURE — 1159F PR MEDICATION LIST DOCUMENTED IN MEDICAL RECORD: ICD-10-PCS | Mod: CPTII,S$GLB,, | Performed by: NURSE PRACTITIONER

## 2022-05-02 PROCEDURE — 3079F DIAST BP 80-89 MM HG: CPT | Mod: CPTII,S$GLB,, | Performed by: NURSE PRACTITIONER

## 2022-05-02 RX ORDER — AMOXICILLIN AND CLAVULANATE POTASSIUM 875; 125 MG/1; MG/1
TABLET, FILM COATED ORAL
COMMUNITY
Start: 2022-04-25 | End: 2022-08-08

## 2022-05-02 NOTE — PROGRESS NOTES
Subjective:       Patient ID: Johanna Iverson is a 26 y.o. female.    Chief Complaint:  Well Woman (Pt st she needs to get her well woman visit and feels she also may have strep and needs her throat checked out. )      History of Present Illness   Presents for annual gyn exam. History and past labs reviewed with patient.    Complains of sore throat recently seen at urgent care to r/o strep  +breast feedings  No contraception  No history of  abnormal paps/stds  Patient's last menstrual period was 2022 (exact date).          OB History        3    Para   3    Term   2            AB        Living   2       SAB        IAB        Ectopic        Multiple        Live Births   2                  Review of Systems  Review of Systems   Constitutional: Negative.    Respiratory: Negative.    Cardiovascular: Negative.    Gastrointestinal: Negative.    Genitourinary: Negative.    Musculoskeletal: Negative.    Psychiatric/Behavioral: Negative.    Breast: negative.            Objective:     Vitals:    22 0851   BP: 122/81   Pulse: 97   Weight: 99.3 kg (219 lb)        Physical Exam:   Constitutional: She is oriented to person, place, and time. She appears well-developed and well-nourished.    HENT:   Head: Normocephalic and atraumatic.   Mouth/Throat: Oropharyngeal exudate present.            Abdominal: Soft.     Genitourinary:    Vagina and uterus normal.             Musculoskeletal: Moves all extremeties.       Neurological: She is alert and oriented to person, place, and time.    Skin: Skin is warm and dry.    Psychiatric: She has a normal mood and affect. Her behavior is normal. Judgment and thought content normal.      Breast exam declined r/t lactation status    Assessment:     1. Well woman exam with routine gynecological exam    2. Screening for malignant neoplasm of the cervix              Plan:       Well woman exam with routine gynecological exam    Screening for malignant neoplasm of the  cervix  -     Liquid-based pap smear, screening       Complete Augment for strep  OTC Women's daily Multi vitamin  Healthy diet, exercise and lifestyle discussed  gardasil discussed   Risk assessment for inherited gyn cancer done-declines referral to Nurse Navigator   Declines contraception      Follow up in about 1 year (around 5/2/2023).

## 2022-08-08 ENCOUNTER — OFFICE VISIT (OUTPATIENT)
Dept: OBSTETRICS AND GYNECOLOGY | Facility: CLINIC | Age: 27
End: 2022-08-08
Payer: MEDICAID

## 2022-08-08 VITALS
DIASTOLIC BLOOD PRESSURE: 87 MMHG | WEIGHT: 197 LBS | SYSTOLIC BLOOD PRESSURE: 133 MMHG | HEART RATE: 86 BPM | BODY MASS INDEX: 31.8 KG/M2

## 2022-08-08 DIAGNOSIS — Z11.3 SCREEN FOR STD (SEXUALLY TRANSMITTED DISEASE): ICD-10-CM

## 2022-08-08 DIAGNOSIS — B96.89 BV (BACTERIAL VAGINOSIS): Primary | ICD-10-CM

## 2022-08-08 DIAGNOSIS — N76.0 BV (BACTERIAL VAGINOSIS): Primary | ICD-10-CM

## 2022-08-08 DIAGNOSIS — Z30.09 FAMILY PLANNING: ICD-10-CM

## 2022-08-08 PROCEDURE — 3079F PR MOST RECENT DIASTOLIC BLOOD PRESSURE 80-89 MM HG: ICD-10-PCS | Mod: CPTII,S$GLB,, | Performed by: NURSE PRACTITIONER

## 2022-08-08 PROCEDURE — 99213 PR OFFICE/OUTPT VISIT, EST, LEVL III, 20-29 MIN: ICD-10-PCS | Mod: S$GLB,,, | Performed by: NURSE PRACTITIONER

## 2022-08-08 PROCEDURE — 3008F BODY MASS INDEX DOCD: CPT | Mod: CPTII,S$GLB,, | Performed by: NURSE PRACTITIONER

## 2022-08-08 PROCEDURE — 3075F SYST BP GE 130 - 139MM HG: CPT | Mod: CPTII,S$GLB,, | Performed by: NURSE PRACTITIONER

## 2022-08-08 PROCEDURE — 3008F PR BODY MASS INDEX (BMI) DOCUMENTED: ICD-10-PCS | Mod: CPTII,S$GLB,, | Performed by: NURSE PRACTITIONER

## 2022-08-08 PROCEDURE — 99213 OFFICE O/P EST LOW 20 MIN: CPT | Mod: S$GLB,,, | Performed by: NURSE PRACTITIONER

## 2022-08-08 PROCEDURE — 1159F PR MEDICATION LIST DOCUMENTED IN MEDICAL RECORD: ICD-10-PCS | Mod: CPTII,S$GLB,, | Performed by: NURSE PRACTITIONER

## 2022-08-08 PROCEDURE — 3075F PR MOST RECENT SYSTOLIC BLOOD PRESS GE 130-139MM HG: ICD-10-PCS | Mod: CPTII,S$GLB,, | Performed by: NURSE PRACTITIONER

## 2022-08-08 PROCEDURE — 1159F MED LIST DOCD IN RCRD: CPT | Mod: CPTII,S$GLB,, | Performed by: NURSE PRACTITIONER

## 2022-08-08 PROCEDURE — 3079F DIAST BP 80-89 MM HG: CPT | Mod: CPTII,S$GLB,, | Performed by: NURSE PRACTITIONER

## 2022-08-08 RX ORDER — METRONIDAZOLE 500 MG/1
500 TABLET ORAL EVERY 12 HOURS
Qty: 14 TABLET | Refills: 0 | Status: SHIPPED | OUTPATIENT
Start: 2022-08-08 | End: 2022-08-15

## 2022-08-08 RX ORDER — LACTIC ACID, L-, CITRIC ACID MONOHYDRATE, AND POTASSIUM BITARTRATE 90; 50; 20 MG/5G; MG/5G; MG/5G
1 GEL VAGINAL
Qty: 120 G | Refills: 3 | Status: SHIPPED | OUTPATIENT
Start: 2022-08-08 | End: 2023-05-02

## 2022-08-08 NOTE — PROGRESS NOTES
CC: Vaginal discharge    Johanna Iverson is a 27 y.o. female  presents with complaint of vaginal discharge s/p changing soaps.     Past Medical History:   Diagnosis Date    Asthma      Past Surgical History:   Procedure Laterality Date     SECTION      x 2     Social History     Tobacco Use    Smoking status: Never Smoker    Smokeless tobacco: Never Used   Substance Use Topics    Alcohol use: Never    Drug use: Never     Family History   Problem Relation Age of Onset    Breast cancer Maternal Aunt      OB History    Para Term  AB Living   3 3 2     2   SAB IAB Ectopic Multiple Live Births           2      # Outcome Date GA Lbr Miquel/2nd Weight Sex Delivery Anes PTL Lv   3 Term  37w0d  2.88 kg (6 lb 5.6 oz) M          Complications: Gestational HTN   2 Para 19    M CS-LTranv  N JOHANNA   1 Term 10/13/15   2.92 kg (6 lb 7 oz) M CS-LTranv Spinal N JOHANNA      Complications: Breech birth         ROS:  GENERAL: No fever, chills, fatigability or weight loss.  VULVAR: No pain, no lesions and no itching.  VAGINAL: No relaxation, no itching, + discharge,+ odor, no abnormal bleeding and no lesions.  ABDOMEN: No abdominal pain. Denies nausea. Denies vomiting. No diarrhea. No constipation  BREAST: Denies pain. No lumps. No discharge.  URINARY: No incontinence, no nocturia, no frequency and no dysuria.  CARDIOVASCULAR: No chest pain. No shortness of breath. No leg cramps.  NEUROLOGICAL: No headaches. No vision changes.      /87   Pulse 86   Wt 89.4 kg (197 lb)   LMP 07/15/2022 (Approximate)   BMI 31.80 kg/m²     PHYSICAL EXAM:  VULVA: normal appearing vulva with no masses, tenderness or lesions, VAGINA: normal appearing vagina with normal color and discharge, no lesions, CERVIX: cervical discharge present - white, malodorous and milky, RECTAL: normal rectal, no masses, WET MOUNT done - results: clue cells    ASSESSMENT and PLAN:    ICD-10-CM ICD-9-CM    1. BV (bacterial vaginosis)   N76.0 616.10 metroNIDAZOLE (FLAGYL) 500 MG tablet    B96.89 041.9    2. Family planning  Z30.09 V25.09 lactic acid-citric-potassium (PHEXXI) 1.8-1-0.4 % Gel   3. Screen for STD (sexually transmitted disease)  Z11.3 V74.5 Trichomonas Vaginalis, SUE      C. trachomatis/N. gonorrhoeae by AMP DNA [facility]; Cervix         Patient was counseled today on vaginitis prevention including :  a. avoiding feminine products such as deoderant soaps, body wash, bubble bath, douches, scented toilet paper, deoderant tampons or pads, feminine wipes, chronic pad use, etc.  b. avoiding other vulvovaginal irritants such as long hot baths, humidity, tight, synthetic clothing, chlorine and sitting around in wet bathing suits  c. wearing cotton underwear, avoiding thong underwear and no underwear to bed  d. taking showers instead of baths and use a hair dryer on cool setting afterwards to dry  e. wearing cotton to exercise and shower immediately after exercise and change clothes  f. using polyurethane condoms without spermicide if sexually active and symptoms are triggered by intercourse    FOLLOW UP: PRN lack of improvement.

## 2022-08-10 LAB
CHLAMYDIA: NEGATIVE
GONORRHEA: NEGATIVE
SOURCE: NORMAL
SOURCE: NORMAL
TRICHOMONAS AMPLIFIED: NEGATIVE

## 2022-08-30 ENCOUNTER — PATIENT MESSAGE (OUTPATIENT)
Dept: OBSTETRICS AND GYNECOLOGY | Facility: CLINIC | Age: 27
End: 2022-08-30
Payer: MEDICAID

## 2022-09-01 ENCOUNTER — OFFICE VISIT (OUTPATIENT)
Dept: OBSTETRICS AND GYNECOLOGY | Facility: CLINIC | Age: 27
End: 2022-09-01
Payer: MEDICAID

## 2022-09-01 VITALS
SYSTOLIC BLOOD PRESSURE: 116 MMHG | DIASTOLIC BLOOD PRESSURE: 75 MMHG | HEIGHT: 66 IN | HEART RATE: 91 BPM | BODY MASS INDEX: 30.44 KG/M2 | WEIGHT: 189.38 LBS

## 2022-09-01 DIAGNOSIS — N76.0 ACUTE VAGINITIS: Primary | ICD-10-CM

## 2022-09-01 PROCEDURE — 3074F PR MOST RECENT SYSTOLIC BLOOD PRESSURE < 130 MM HG: ICD-10-PCS | Mod: CPTII,S$GLB,, | Performed by: NURSE PRACTITIONER

## 2022-09-01 PROCEDURE — 3078F DIAST BP <80 MM HG: CPT | Mod: CPTII,S$GLB,, | Performed by: NURSE PRACTITIONER

## 2022-09-01 PROCEDURE — 3008F BODY MASS INDEX DOCD: CPT | Mod: CPTII,S$GLB,, | Performed by: NURSE PRACTITIONER

## 2022-09-01 PROCEDURE — 3078F PR MOST RECENT DIASTOLIC BLOOD PRESSURE < 80 MM HG: ICD-10-PCS | Mod: CPTII,S$GLB,, | Performed by: NURSE PRACTITIONER

## 2022-09-01 PROCEDURE — 3008F PR BODY MASS INDEX (BMI) DOCUMENTED: ICD-10-PCS | Mod: CPTII,S$GLB,, | Performed by: NURSE PRACTITIONER

## 2022-09-01 PROCEDURE — 3074F SYST BP LT 130 MM HG: CPT | Mod: CPTII,S$GLB,, | Performed by: NURSE PRACTITIONER

## 2022-09-01 PROCEDURE — 99213 OFFICE O/P EST LOW 20 MIN: CPT | Mod: S$GLB,,, | Performed by: NURSE PRACTITIONER

## 2022-09-01 PROCEDURE — 99213 PR OFFICE/OUTPT VISIT, EST, LEVL III, 20-29 MIN: ICD-10-PCS | Mod: S$GLB,,, | Performed by: NURSE PRACTITIONER

## 2022-09-01 RX ORDER — METRONIDAZOLE 500 MG/1
500 TABLET ORAL 2 TIMES DAILY
Qty: 14 TABLET | Refills: 0 | Status: SHIPPED | OUTPATIENT
Start: 2022-09-01 | End: 2022-09-08

## 2022-09-01 NOTE — PROGRESS NOTES
"  CC: Vaginal discharge    Johanna Iverson is a 27 y.o. female  presents with complaint of vaginal discharge and irritation for the last week. No relief with OTC monistat    Past Medical History:   Diagnosis Date    Asthma     Gestational diabetes mellitus (GDM) affecting third pregnancy      Past Surgical History:   Procedure Laterality Date     SECTION      x 2     Social History     Tobacco Use    Smoking status: Never    Smokeless tobacco: Never   Substance Use Topics    Alcohol use: Never    Drug use: Never     Family History   Problem Relation Age of Onset    Breast cancer Maternal Aunt      OB History    Para Term  AB Living   3 3 2     2   SAB IAB Ectopic Multiple Live Births           2      # Outcome Date GA Lbr Miquel/2nd Weight Sex Delivery Anes PTL Lv   3 Term  37w0d  2.88 kg (6 lb 5.6 oz) M          Complications: Gestational HTN   2 Para 19    M CS-LTranv  N JOHANNA   1 Term 10/13/15   2.92 kg (6 lb 7 oz) M CS-LTranv Spinal N JOHANNA      Complications: Breech birth         ROS:  GENERAL: No fever, chills, fatigability or weight loss.  VULVAR: No pain, no lesions and +itching.  VAGINAL: No relaxation, + itching, + discharge, no abnormal bleeding and no lesions.  ABDOMEN: No abdominal pain. Denies nausea. Denies vomiting. No diarrhea. No constipation  BREAST: Denies pain. No lumps. No discharge.  URINARY: No incontinence, no nocturia, no frequency and no dysuria.  CARDIOVASCULAR: No chest pain. No shortness of breath. No leg cramps.  NEUROLOGICAL: No headaches. No vision changes.      /75   Pulse 91   Ht 5' 6" (1.676 m)   Wt 85.9 kg (189 lb 6 oz)   LMP 2022 (Exact Date) Comment: present  Breastfeeding No   BMI 30.57 kg/m²     PHYSICAL EXAM:  VULVA: normal appearing vulva with no masses, tenderness or lesions, VAGINA: normal appearing vagina with normal color and discharge, no lesions, CERVIX: cervical discharge present - white, creamy, and bloody, WET MOUNT " done - results: clue cells    ASSESSMENT and PLAN:    ICD-10-CM ICD-9-CM    1. Acute vaginitis  N76.0 616.10 metroNIDAZOLE (FLAGYL) 500 MG tablet          One swab collected   Patient was counseled today on vaginitis prevention including :  a. avoiding feminine products such as deoderant soaps, body wash, bubble bath, douches, scented toilet paper, deoderant tampons or pads, feminine wipes, chronic pad use, etc.  b. avoiding other vulvovaginal irritants such as long hot baths, humidity, tight, synthetic clothing, chlorine and sitting around in wet bathing suits  c. wearing cotton underwear, avoiding thong underwear and no underwear to bed  d. taking showers instead of baths and use a hair dryer on cool setting afterwards to dry  e. wearing cotton to exercise and shower immediately after exercise and change clothes  f. using polyurethane condoms without spermicide if sexually active and symptoms are triggered by intercourse    FOLLOW UP: PRN lack of improvement.

## 2022-09-15 ENCOUNTER — PATIENT MESSAGE (OUTPATIENT)
Dept: OBSTETRICS AND GYNECOLOGY | Facility: CLINIC | Age: 27
End: 2022-09-15
Payer: MEDICAID

## 2022-10-06 ENCOUNTER — PATIENT MESSAGE (OUTPATIENT)
Dept: OBSTETRICS AND GYNECOLOGY | Facility: CLINIC | Age: 27
End: 2022-10-06
Payer: MEDICAID

## 2022-10-13 ENCOUNTER — OFFICE VISIT (OUTPATIENT)
Dept: OBSTETRICS AND GYNECOLOGY | Facility: CLINIC | Age: 27
End: 2022-10-13
Payer: MEDICAID

## 2022-10-13 VITALS
HEART RATE: 91 BPM | SYSTOLIC BLOOD PRESSURE: 115 MMHG | BODY MASS INDEX: 29.41 KG/M2 | DIASTOLIC BLOOD PRESSURE: 76 MMHG | HEIGHT: 66 IN | WEIGHT: 183 LBS

## 2022-10-13 DIAGNOSIS — Z86.32 HX GESTATIONAL DIABETES: ICD-10-CM

## 2022-10-13 DIAGNOSIS — Z30.09 FAMILY PLANNING: ICD-10-CM

## 2022-10-13 DIAGNOSIS — N76.0 BV (BACTERIAL VAGINOSIS): Primary | ICD-10-CM

## 2022-10-13 DIAGNOSIS — B96.89 BV (BACTERIAL VAGINOSIS): Primary | ICD-10-CM

## 2022-10-13 PROCEDURE — 1159F PR MEDICATION LIST DOCUMENTED IN MEDICAL RECORD: ICD-10-PCS | Mod: CPTII,S$GLB,, | Performed by: NURSE PRACTITIONER

## 2022-10-13 PROCEDURE — 3074F SYST BP LT 130 MM HG: CPT | Mod: CPTII,S$GLB,, | Performed by: NURSE PRACTITIONER

## 2022-10-13 PROCEDURE — 99213 OFFICE O/P EST LOW 20 MIN: CPT | Mod: S$GLB,,, | Performed by: NURSE PRACTITIONER

## 2022-10-13 PROCEDURE — 3078F PR MOST RECENT DIASTOLIC BLOOD PRESSURE < 80 MM HG: ICD-10-PCS | Mod: CPTII,S$GLB,, | Performed by: NURSE PRACTITIONER

## 2022-10-13 PROCEDURE — 1159F MED LIST DOCD IN RCRD: CPT | Mod: CPTII,S$GLB,, | Performed by: NURSE PRACTITIONER

## 2022-10-13 PROCEDURE — 3078F DIAST BP <80 MM HG: CPT | Mod: CPTII,S$GLB,, | Performed by: NURSE PRACTITIONER

## 2022-10-13 PROCEDURE — 99213 PR OFFICE/OUTPT VISIT, EST, LEVL III, 20-29 MIN: ICD-10-PCS | Mod: S$GLB,,, | Performed by: NURSE PRACTITIONER

## 2022-10-13 PROCEDURE — 3074F PR MOST RECENT SYSTOLIC BLOOD PRESSURE < 130 MM HG: ICD-10-PCS | Mod: CPTII,S$GLB,, | Performed by: NURSE PRACTITIONER

## 2022-10-13 RX ORDER — NORELGESTROMIN AND ETHINYL ESTRADIOL 35; 150 UG/MG; UG/MG
1 PATCH TRANSDERMAL
Qty: 3 PATCH | Refills: 3 | Status: SHIPPED | OUTPATIENT
Start: 2022-10-13 | End: 2022-12-06

## 2022-10-13 RX ORDER — CLINDAMYCIN HYDROCHLORIDE 300 MG/1
300 CAPSULE ORAL 2 TIMES DAILY
Qty: 14 CAPSULE | Refills: 0 | Status: SHIPPED | OUTPATIENT
Start: 2022-10-13 | End: 2022-12-06

## 2022-10-13 NOTE — PROGRESS NOTES
"  CC: Vaginal discharge    Johanna Iverson is a 27 y.o. female  presents with complaint of vaginal discharge for 4-5 days and occurred after having vaginal intercourse s/p anal intercourse. Hx of recurrent BV in the last year. Hx of GDM. Not using Phexxi regularly for contraception.  Desires contraception.   Patient's last menstrual period was 10/13/2022 (exact date).      Past Medical History:   Diagnosis Date    Asthma     Gestational diabetes mellitus (GDM) affecting third pregnancy      Past Surgical History:   Procedure Laterality Date     SECTION      x 2     Social History     Tobacco Use    Smoking status: Never    Smokeless tobacco: Never   Substance Use Topics    Alcohol use: Never    Drug use: Never     Family History   Problem Relation Age of Onset    Breast cancer Maternal Aunt      OB History    Para Term  AB Living   3 3 2     2   SAB IAB Ectopic Multiple Live Births           2      # Outcome Date GA Lbr Miquel/2nd Weight Sex Delivery Anes PTL Lv   3 Term  37w0d  2.88 kg (6 lb 5.6 oz) M          Complications: Gestational HTN   2 Para 19    M CS-LTranv  N JOHANNA   1 Term 10/13/15   2.92 kg (6 lb 7 oz) M CS-LTranv Spinal N JOHANNA      Complications: Breech birth         ROS:  GENERAL: No fever, chills, fatigability or weight loss.  VULVAR: No pain, no lesions and no itching.  VAGINAL: No relaxation, no itching, +discharge, no abnormal bleeding and no lesions.+odor  ABDOMEN: No abdominal pain. Denies nausea. Denies vomiting. No diarrhea. No constipation  BREAST: Denies pain. No lumps. No discharge.  URINARY: No incontinence, no nocturia, no frequency and no dysuria.  CARDIOVASCULAR: No chest pain. No shortness of breath. No leg cramps.  NEUROLOGICAL: No headaches. No vision changes.      /76   Pulse 91   Ht 5' 6" (1.676 m)   Wt 83 kg (183 lb)   LMP 10/13/2022 (Exact Date)   BMI 29.54 kg/m²     PHYSICAL EXAM:  exam declined by the patient    ASSESSMENT and PLAN:    " ICD-10-CM ICD-9-CM    1. BV (bacterial vaginosis)  N76.0 616.10 clindamycin (CLEOCIN) 300 MG capsule    B96.89 041.9       2. Hx gestational diabetes  Z86.32 V12.21 Hemoglobin A1C      Hemoglobin A1C      3. Family planning  Z30.09 V25.09 norelgestromin-ethinyl estradiol 150-35 mcg/24 hr        All methods of contraception discussed   Risks, benefits, and side effects of each discussed   Offered OCPs, mini pill, Patch, nuvaring, annovera, nexplanon, depo provera, IUD,  vaginal gel or permanent sterilization   Pt opted for patch   Patient was counseled today on vaginitis prevention including :  a. avoiding feminine products such as deoderant soaps, body wash, bubble bath, douches, scented toilet paper, deoderant tampons or pads, feminine wipes, chronic pad use, etc.  b. avoiding other vulvovaginal irritants such as long hot baths, humidity, tight, synthetic clothing, chlorine and sitting around in wet bathing suits  c. wearing cotton underwear, avoiding thong underwear and no underwear to bed  d. taking showers instead of baths and use a hair dryer on cool setting afterwards to dry  e. wearing cotton to exercise and shower immediately after exercise and change clothes  f. using polyurethane condoms without spermicide if sexually active and symptoms are triggered by intercourse  Add probiotic for vaginal health   FOLLOW UP:  3 months

## 2022-10-17 ENCOUNTER — PATIENT MESSAGE (OUTPATIENT)
Dept: OBSTETRICS AND GYNECOLOGY | Facility: CLINIC | Age: 27
End: 2022-10-17
Payer: MEDICAID

## 2022-10-18 ENCOUNTER — PATIENT MESSAGE (OUTPATIENT)
Dept: OBSTETRICS AND GYNECOLOGY | Facility: CLINIC | Age: 27
End: 2022-10-18
Payer: MEDICAID

## 2022-10-26 ENCOUNTER — PATIENT MESSAGE (OUTPATIENT)
Dept: OBSTETRICS AND GYNECOLOGY | Facility: CLINIC | Age: 27
End: 2022-10-26
Payer: MEDICAID

## 2022-11-10 ENCOUNTER — PATIENT MESSAGE (OUTPATIENT)
Dept: OBSTETRICS AND GYNECOLOGY | Facility: CLINIC | Age: 27
End: 2022-11-10
Payer: MEDICAID

## 2022-12-01 ENCOUNTER — PATIENT MESSAGE (OUTPATIENT)
Dept: OBSTETRICS AND GYNECOLOGY | Facility: CLINIC | Age: 27
End: 2022-12-01
Payer: MEDICAID

## 2022-12-06 ENCOUNTER — OFFICE VISIT (OUTPATIENT)
Dept: OBSTETRICS AND GYNECOLOGY | Facility: CLINIC | Age: 27
End: 2022-12-06
Payer: MEDICAID

## 2022-12-06 VITALS
BODY MASS INDEX: 29.73 KG/M2 | WEIGHT: 185 LBS | HEIGHT: 66 IN | SYSTOLIC BLOOD PRESSURE: 124 MMHG | DIASTOLIC BLOOD PRESSURE: 75 MMHG | HEART RATE: 101 BPM

## 2022-12-06 DIAGNOSIS — N76.0 BV (BACTERIAL VAGINOSIS): Primary | ICD-10-CM

## 2022-12-06 DIAGNOSIS — Z32.02 NEGATIVE PREGNANCY TEST: ICD-10-CM

## 2022-12-06 DIAGNOSIS — B96.89 BV (BACTERIAL VAGINOSIS): Primary | ICD-10-CM

## 2022-12-06 LAB
B-HCG UR QL: NEGATIVE
CTP QC/QA: YES

## 2022-12-06 PROCEDURE — 99213 PR OFFICE/OUTPT VISIT, EST, LEVL III, 20-29 MIN: ICD-10-PCS | Mod: S$GLB,,, | Performed by: NURSE PRACTITIONER

## 2022-12-06 PROCEDURE — 3074F PR MOST RECENT SYSTOLIC BLOOD PRESSURE < 130 MM HG: ICD-10-PCS | Mod: CPTII,S$GLB,, | Performed by: NURSE PRACTITIONER

## 2022-12-06 PROCEDURE — 81025 URINE PREGNANCY TEST: CPT | Mod: S$GLB,,, | Performed by: NURSE PRACTITIONER

## 2022-12-06 PROCEDURE — 3008F BODY MASS INDEX DOCD: CPT | Mod: CPTII,S$GLB,, | Performed by: NURSE PRACTITIONER

## 2022-12-06 PROCEDURE — 3074F SYST BP LT 130 MM HG: CPT | Mod: CPTII,S$GLB,, | Performed by: NURSE PRACTITIONER

## 2022-12-06 PROCEDURE — 1159F PR MEDICATION LIST DOCUMENTED IN MEDICAL RECORD: ICD-10-PCS | Mod: CPTII,S$GLB,, | Performed by: NURSE PRACTITIONER

## 2022-12-06 PROCEDURE — 99213 OFFICE O/P EST LOW 20 MIN: CPT | Mod: S$GLB,,, | Performed by: NURSE PRACTITIONER

## 2022-12-06 PROCEDURE — 3078F PR MOST RECENT DIASTOLIC BLOOD PRESSURE < 80 MM HG: ICD-10-PCS | Mod: CPTII,S$GLB,, | Performed by: NURSE PRACTITIONER

## 2022-12-06 PROCEDURE — 3078F DIAST BP <80 MM HG: CPT | Mod: CPTII,S$GLB,, | Performed by: NURSE PRACTITIONER

## 2022-12-06 PROCEDURE — 1159F MED LIST DOCD IN RCRD: CPT | Mod: CPTII,S$GLB,, | Performed by: NURSE PRACTITIONER

## 2022-12-06 PROCEDURE — 3008F PR BODY MASS INDEX (BMI) DOCUMENTED: ICD-10-PCS | Mod: CPTII,S$GLB,, | Performed by: NURSE PRACTITIONER

## 2022-12-06 PROCEDURE — 81025 POCT URINE PREGNANCY: ICD-10-PCS | Mod: S$GLB,,, | Performed by: NURSE PRACTITIONER

## 2022-12-06 RX ORDER — METRONIDAZOLE 500 MG/1
500 TABLET ORAL 2 TIMES DAILY
Qty: 14 TABLET | Refills: 0 | Status: SHIPPED | OUTPATIENT
Start: 2022-12-06 | End: 2022-12-13

## 2022-12-06 RX ORDER — PHENTERMINE HYDROCHLORIDE 37.5 MG/1
37.5 TABLET ORAL EVERY MORNING
COMMUNITY
Start: 2022-07-19 | End: 2023-08-02

## 2022-12-06 NOTE — PROGRESS NOTES
"  CC: Vaginal discharge    Johanna Iverson is a 27 y.o. female  presents with complaint of vaginal discharge with odor for the last week. Also complains of abdominal bloating.  Concerned about possible pregnancy.  Patient's last menstrual period was 2022 (exact date).  UPT negative     Past Medical History:   Diagnosis Date    Asthma     Gestational diabetes mellitus (GDM) affecting third pregnancy      Past Surgical History:   Procedure Laterality Date     SECTION      x 2     Social History     Tobacco Use    Smoking status: Never    Smokeless tobacco: Never   Substance Use Topics    Alcohol use: Never    Drug use: Never     Family History   Problem Relation Age of Onset    Breast cancer Maternal Aunt      OB History    Para Term  AB Living   3 3 2     2   SAB IAB Ectopic Multiple Live Births           2      # Outcome Date GA Lbr Miquel/2nd Weight Sex Delivery Anes PTL Lv   3 Term  37w0d  2.88 kg (6 lb 5.6 oz) M          Complications: Gestational HTN   2 Para 19    M CS-LTranv  N JOHANNA   1 Term 10/13/15   2.92 kg (6 lb 7 oz) M CS-LTranv Spinal N JOHANNA      Complications: Breech birth         ROS:  GENERAL: No fever, chills, fatigability or weight loss.  VULVAR: No pain, no lesions and no itching.  VAGINAL: No relaxation, no itching, +discharge, no abnormal bleeding and no lesions.+ odor   ABDOMEN: No abdominal pain. Denies nausea. Denies vomiting. No diarrhea. No constipation +bloating   BREAST: Denies pain. No lumps. No discharge.  URINARY: No incontinence, no nocturia, no frequency and no dysuria.  CARDIOVASCULAR: No chest pain. No shortness of breath. No leg cramps.  NEUROLOGICAL: No headaches. No vision changes.      /75   Pulse 101   Ht 5' 6" (1.676 m)   Wt 83.9 kg (185 lb)   LMP 2022 (Exact Date)   Breastfeeding No   BMI 29.86 kg/m²     PHYSICAL EXAM:  VULVA: normal appearing vulva with no masses, tenderness or lesions, VAGINA: vaginal discharge - " white, creamy, and bloody, CERVIX: cervical discharge present - copious and malodorous, UTERUS: uterus is normal size, shape, consistency and nontender, WET MOUNT done - results: clue cells, white blood cells    ASSESSMENT and PLAN:    ICD-10-CM ICD-9-CM    1. BV (bacterial vaginosis)  N76.0 616.10 metroNIDAZOLE (FLAGYL) 500 MG tablet    B96.89 041.9           Clairvee for vaginal health  Will consider suppressive therapy for recurrent BV  Encouraged to use phexxi   Patient was counseled today on vaginitis prevention including :  a. avoiding feminine products such as deoderant soaps, body wash, bubble bath, douches, scented toilet paper, deoderant tampons or pads, feminine wipes, chronic pad use, etc.  b. avoiding other vulvovaginal irritants such as long hot baths, humidity, tight, synthetic clothing, chlorine and sitting around in wet bathing suits  c. wearing cotton underwear, avoiding thong underwear and no underwear to bed  d. taking showers instead of baths and use a hair dryer on cool setting afterwards to dry  e. wearing cotton to exercise and shower immediately after exercise and change clothes  f. using polyurethane condoms without spermicide if sexually active and symptoms are triggered by intercourse    FOLLOW UP: PRN lack of improvement.

## 2023-01-06 ENCOUNTER — OFFICE VISIT (OUTPATIENT)
Dept: OBSTETRICS AND GYNECOLOGY | Facility: CLINIC | Age: 28
End: 2023-01-06
Payer: MEDICAID

## 2023-01-06 VITALS
WEIGHT: 182.81 LBS | BODY MASS INDEX: 29.5 KG/M2 | HEART RATE: 80 BPM | SYSTOLIC BLOOD PRESSURE: 132 MMHG | DIASTOLIC BLOOD PRESSURE: 84 MMHG

## 2023-01-06 DIAGNOSIS — N93.9 ABNORMAL UTERINE BLEEDING (AUB): Primary | ICD-10-CM

## 2023-01-06 DIAGNOSIS — Z11.3 SCREEN FOR STD (SEXUALLY TRANSMITTED DISEASE): ICD-10-CM

## 2023-01-06 PROBLEM — Z98.891 S/P C-SECTION: Status: RESOLVED | Noted: 2021-12-09 | Resolved: 2023-01-06

## 2023-01-06 PROBLEM — O99.810 ABNORMAL O'SULLIVAN GLUCOSE CHALLENGE TEST, ANTEPARTUM: Status: RESOLVED | Noted: 2021-10-11 | Resolved: 2023-01-06

## 2023-01-06 PROBLEM — O13.9 GESTATIONAL HYPERTENSION: Status: RESOLVED | Noted: 2021-12-09 | Resolved: 2023-01-06

## 2023-01-06 PROBLEM — Z34.92 ENCOUNTER FOR SUPERVISION OF NORMAL PREGNANCY IN SECOND TRIMESTER: Status: RESOLVED | Noted: 2021-09-16 | Resolved: 2023-01-06

## 2023-01-06 PROCEDURE — 3075F SYST BP GE 130 - 139MM HG: CPT | Mod: CPTII,S$GLB,, | Performed by: STUDENT IN AN ORGANIZED HEALTH CARE EDUCATION/TRAINING PROGRAM

## 2023-01-06 PROCEDURE — 3008F PR BODY MASS INDEX (BMI) DOCUMENTED: ICD-10-PCS | Mod: CPTII,S$GLB,, | Performed by: STUDENT IN AN ORGANIZED HEALTH CARE EDUCATION/TRAINING PROGRAM

## 2023-01-06 PROCEDURE — 3075F PR MOST RECENT SYSTOLIC BLOOD PRESS GE 130-139MM HG: ICD-10-PCS | Mod: CPTII,S$GLB,, | Performed by: STUDENT IN AN ORGANIZED HEALTH CARE EDUCATION/TRAINING PROGRAM

## 2023-01-06 PROCEDURE — 3079F PR MOST RECENT DIASTOLIC BLOOD PRESSURE 80-89 MM HG: ICD-10-PCS | Mod: CPTII,S$GLB,, | Performed by: STUDENT IN AN ORGANIZED HEALTH CARE EDUCATION/TRAINING PROGRAM

## 2023-01-06 PROCEDURE — 1160F PR REVIEW ALL MEDS BY PRESCRIBER/CLIN PHARMACIST DOCUMENTED: ICD-10-PCS | Mod: CPTII,S$GLB,, | Performed by: STUDENT IN AN ORGANIZED HEALTH CARE EDUCATION/TRAINING PROGRAM

## 2023-01-06 PROCEDURE — 3008F BODY MASS INDEX DOCD: CPT | Mod: CPTII,S$GLB,, | Performed by: STUDENT IN AN ORGANIZED HEALTH CARE EDUCATION/TRAINING PROGRAM

## 2023-01-06 PROCEDURE — 1159F MED LIST DOCD IN RCRD: CPT | Mod: CPTII,S$GLB,, | Performed by: STUDENT IN AN ORGANIZED HEALTH CARE EDUCATION/TRAINING PROGRAM

## 2023-01-06 PROCEDURE — 3079F DIAST BP 80-89 MM HG: CPT | Mod: CPTII,S$GLB,, | Performed by: STUDENT IN AN ORGANIZED HEALTH CARE EDUCATION/TRAINING PROGRAM

## 2023-01-06 PROCEDURE — 99213 OFFICE O/P EST LOW 20 MIN: CPT | Mod: S$GLB,,, | Performed by: STUDENT IN AN ORGANIZED HEALTH CARE EDUCATION/TRAINING PROGRAM

## 2023-01-06 PROCEDURE — 1159F PR MEDICATION LIST DOCUMENTED IN MEDICAL RECORD: ICD-10-PCS | Mod: CPTII,S$GLB,, | Performed by: STUDENT IN AN ORGANIZED HEALTH CARE EDUCATION/TRAINING PROGRAM

## 2023-01-06 PROCEDURE — 99213 PR OFFICE/OUTPT VISIT, EST, LEVL III, 20-29 MIN: ICD-10-PCS | Mod: S$GLB,,, | Performed by: STUDENT IN AN ORGANIZED HEALTH CARE EDUCATION/TRAINING PROGRAM

## 2023-01-06 PROCEDURE — 1160F RVW MEDS BY RX/DR IN RCRD: CPT | Mod: CPTII,S$GLB,, | Performed by: STUDENT IN AN ORGANIZED HEALTH CARE EDUCATION/TRAINING PROGRAM

## 2023-01-06 RX ORDER — NORGESTIMATE AND ETHINYL ESTRADIOL 7DAYSX3 28
1 KIT ORAL DAILY
Qty: 90 TABLET | Refills: 3 | Status: SHIPPED | OUTPATIENT
Start: 2023-01-06 | End: 2023-07-10

## 2023-01-06 NOTE — PROGRESS NOTES
Chief Complaint: AUB    HPI: Patient is a 27 y.o. y.o. female  who presents to GYN clinic for normal uterine bleeding.    Patient reports over the past become more irregular.  She may have to cycles per month versus having a prolonged cycle.  She is not currently on any medication for cycle regulation.  During this time she also reports having recurrent bacterial vaginosis which has been treated.  She is no other complaints today.  Review of systems negative unless mentioned above.    Physical Exam:  Vitals:    23 0911   BP: 132/84   Pulse: 80   Weight: 82.9 kg (182 lb 12.8 oz)   Body mass index is 29.5 kg/m².    Gen: NAD, well developed, well nourished  Psych: alert and oriented x 3, normal affect  HEENT: normocephalic, atraumatic  Abd: soft, non-tender, non-distended  Skin: Warm and dry  Ext: no c/c/c, moves all extremities  Neurological: normal gait, gross motor function intact    Assessment: Patient is a 27 y.o. y.o. female  with  Patient Active Problem List   Diagnosis    History of     Abnormal uterine bleeding (AUB)       Plan:  Patient counseled on different treatment options for her abnormal uterine bleeding   Patient agreeable starting OCPs for cycle regulation  UPT negative, gonorrhea chlamydia ordered   Will start patient on Tri Sprintec today  Return to clinic in 3 months or sooner if needed    Lio Mckeon MD

## 2023-02-28 ENCOUNTER — PATIENT MESSAGE (OUTPATIENT)
Dept: OBSTETRICS AND GYNECOLOGY | Facility: CLINIC | Age: 28
End: 2023-02-28
Payer: MEDICAID

## 2023-02-28 DIAGNOSIS — F41.1 GAD (GENERALIZED ANXIETY DISORDER): Primary | ICD-10-CM

## 2023-02-28 RX ORDER — SERTRALINE HYDROCHLORIDE 50 MG/1
50 TABLET, FILM COATED ORAL DAILY
Qty: 30 TABLET | Refills: 2 | Status: SHIPPED | OUTPATIENT
Start: 2023-02-28 | End: 2023-05-02

## 2023-05-02 ENCOUNTER — OFFICE VISIT (OUTPATIENT)
Dept: OBSTETRICS AND GYNECOLOGY | Facility: CLINIC | Age: 28
End: 2023-05-02
Payer: MEDICAID

## 2023-05-02 VITALS
HEART RATE: 82 BPM | WEIGHT: 169.5 LBS | BODY MASS INDEX: 27.24 KG/M2 | SYSTOLIC BLOOD PRESSURE: 122 MMHG | DIASTOLIC BLOOD PRESSURE: 82 MMHG | HEIGHT: 66 IN

## 2023-05-02 DIAGNOSIS — F41.1 GAD (GENERALIZED ANXIETY DISORDER): ICD-10-CM

## 2023-05-02 DIAGNOSIS — Z01.419 WELL WOMAN EXAM WITH ROUTINE GYNECOLOGICAL EXAM: Primary | ICD-10-CM

## 2023-05-02 DIAGNOSIS — R10.2 PELVIC PAIN: ICD-10-CM

## 2023-05-02 PROCEDURE — 1159F PR MEDICATION LIST DOCUMENTED IN MEDICAL RECORD: ICD-10-PCS | Mod: CPTII,S$GLB,, | Performed by: NURSE PRACTITIONER

## 2023-05-02 PROCEDURE — 3079F PR MOST RECENT DIASTOLIC BLOOD PRESSURE 80-89 MM HG: ICD-10-PCS | Mod: CPTII,S$GLB,, | Performed by: NURSE PRACTITIONER

## 2023-05-02 PROCEDURE — 99395 PR PREVENTIVE VISIT,EST,18-39: ICD-10-PCS | Mod: S$GLB,,, | Performed by: NURSE PRACTITIONER

## 2023-05-02 PROCEDURE — 3074F SYST BP LT 130 MM HG: CPT | Mod: CPTII,S$GLB,, | Performed by: NURSE PRACTITIONER

## 2023-05-02 PROCEDURE — 1160F RVW MEDS BY RX/DR IN RCRD: CPT | Mod: CPTII,S$GLB,, | Performed by: NURSE PRACTITIONER

## 2023-05-02 PROCEDURE — 3074F PR MOST RECENT SYSTOLIC BLOOD PRESSURE < 130 MM HG: ICD-10-PCS | Mod: CPTII,S$GLB,, | Performed by: NURSE PRACTITIONER

## 2023-05-02 PROCEDURE — 3008F BODY MASS INDEX DOCD: CPT | Mod: CPTII,S$GLB,, | Performed by: NURSE PRACTITIONER

## 2023-05-02 PROCEDURE — 3079F DIAST BP 80-89 MM HG: CPT | Mod: CPTII,S$GLB,, | Performed by: NURSE PRACTITIONER

## 2023-05-02 PROCEDURE — 1160F PR REVIEW ALL MEDS BY PRESCRIBER/CLIN PHARMACIST DOCUMENTED: ICD-10-PCS | Mod: CPTII,S$GLB,, | Performed by: NURSE PRACTITIONER

## 2023-05-02 PROCEDURE — 1159F MED LIST DOCD IN RCRD: CPT | Mod: CPTII,S$GLB,, | Performed by: NURSE PRACTITIONER

## 2023-05-02 PROCEDURE — 3008F PR BODY MASS INDEX (BMI) DOCUMENTED: ICD-10-PCS | Mod: CPTII,S$GLB,, | Performed by: NURSE PRACTITIONER

## 2023-05-02 PROCEDURE — 99395 PREV VISIT EST AGE 18-39: CPT | Mod: S$GLB,,, | Performed by: NURSE PRACTITIONER

## 2023-05-02 RX ORDER — SERTRALINE HYDROCHLORIDE 100 MG/1
100 TABLET, FILM COATED ORAL DAILY
Qty: 30 TABLET | Refills: 11 | Status: SHIPPED | OUTPATIENT
Start: 2023-05-02 | End: 2023-08-02

## 2023-05-02 RX ORDER — HYDROXYZINE PAMOATE 50 MG/1
50 CAPSULE ORAL EVERY 8 HOURS PRN
Qty: 30 CAPSULE | Refills: 3 | Status: SHIPPED | OUTPATIENT
Start: 2023-05-02

## 2023-05-02 NOTE — PROGRESS NOTES
"    Subjective:       Patient ID: Johanna Iverson is a 27 y.o. female.    Chief Complaint:  Well Woman      History of Present Illness   Presents for annual gyn exam. History and past labs reviewed with patient.    Complaints of pelvic pain and a few also + pregnancy test. States she is concerned about having some sort of tumor.   Not taking OCP r/t having trouble remembering to take it daily.   Hx of irregular cycles, but have become more regular the last few months  Patient's last menstrual period was 2023 (exact date).      OB History          3    Para   3    Term   2            AB        Living   2         SAB        IAB        Ectopic        Multiple        Live Births   2                  Review of Systems  Review of Systems   Constitutional:  Negative for chills and fever.   Respiratory:  Negative for shortness of breath.    Cardiovascular:  Negative for chest pain.   Gastrointestinal:  Negative for abdominal pain, blood in stool, constipation, diarrhea, nausea, vomiting and reflux.   Genitourinary:  Positive for pelvic pain. Negative for dysmenorrhea, dyspareunia, dysuria, hematuria, hot flashes, menorrhagia, menstrual problem, vaginal bleeding, vaginal discharge, postcoital bleeding and vaginal dryness.   Musculoskeletal:  Negative for arthralgias and joint swelling.   Integumentary:  Negative for rash, hair changes, breast mass, nipple discharge and breast skin changes.   Psychiatric/Behavioral:  Negative for depression. The patient is not nervous/anxious.    Breast: Negative for asymmetry, lump, mass, nipple discharge and skin changes        Objective:     Vitals:    23 0912   BP: 122/82   Pulse: 82   Weight: 76.9 kg (169 lb 8 oz)   Height: 5' 6" (1.676 m)        Physical Exam:   Constitutional: She is oriented to person, place, and time. She appears well-developed and well-nourished.    HENT:   Head: Normocephalic and atraumatic.    Eyes: Pupils are equal, round, and reactive to " light. Conjunctivae are normal.    Neck: No thyromegaly present.    Cardiovascular:  Normal rate, regular rhythm and normal heart sounds.             Pulmonary/Chest: Effort normal and breath sounds normal.        Abdominal: Soft.     Genitourinary:    Inguinal canal, vagina, uterus, right adnexa, left adnexa and rectum normal.      Pelvic exam was performed with patient supine.   The external female genitalia was normal.   Genitalia hair distrobution normal .   Labial bartholins normal.Cervix is normal. No  no vaginal discharge in the vagina.    pap smear not completedUterus consistancy normal. and Uerus contour normal  Uterus is not tender. Normal urethral meatus.          Musculoskeletal: Normal range of motion and moves all extremeties.       Neurological: She is alert and oriented to person, place, and time. She has normal reflexes.    Skin: Skin is warm and dry.    Psychiatric: She has a normal mood and affect. Her behavior is normal. Judgment and thought content normal.     breast exam wnl- no nipple dc, skin changes, masses or lymph nodes palpated bilaterally    Pap 05/2022-ASCUS with negative HPV     Assessment:     1. Well woman exam with routine gynecological exam    2. SUNDEEP (generalized anxiety disorder)    3. Pelvic pain              Plan:       Well woman exam with routine gynecological exam    SUNDEEP (generalized anxiety disorder)  -     sertraline (ZOLOFT) 100 MG tablet; Take 1 tablet (100 mg total) by mouth once daily.  Dispense: 30 tablet; Refill: 11  -     hydrOXYzine pamoate (VISTARIL) 50 MG Cap; Take 1 capsule (50 mg total) by mouth every 8 (eight) hours as needed (anxiety).  Dispense: 30 capsule; Refill: 3    Pelvic pain  -     US OB/GYN Procedure ([facility]) - Extended List; Future       All methods of contraception discussed   Risks, benefits, and side effects of each discussed   Offered OCPs, mini pill, Patch, nuvaring, annovera, nexplanon, depo provera, IUD,  vaginal gel or permanent  sterilization   She is considering BTL vs IUD-will discus with  and decide at a later time   OTC Women's daily Multi vitamin  Healthy diet, exercise and lifestyle discussed  Risk assessment for inherited gyn cancer done-declines referral to NN   Discussed ASCCP guidelines regarding paps.   Breast awareness discussed       Follow up in about 3 months (around 8/2/2023).

## 2023-05-16 ENCOUNTER — TELEPHONE (OUTPATIENT)
Dept: OBSTETRICS AND GYNECOLOGY | Facility: CLINIC | Age: 28
End: 2023-05-16
Payer: MEDICAID

## 2023-05-16 NOTE — TELEPHONE ENCOUNTER
----- Message from Chasity Lucio sent at 5/16/2023  8:31 AM CDT -----  Regarding: ultrasound appt  Contact: patient  PT is wanting to cancel her ultrasound for today and reschedule, return call [phone]

## 2023-05-18 ENCOUNTER — PROCEDURE VISIT (OUTPATIENT)
Dept: OBSTETRICS AND GYNECOLOGY | Facility: CLINIC | Age: 28
End: 2023-05-18
Payer: MEDICAID

## 2023-05-18 DIAGNOSIS — R10.2 PELVIC PAIN: ICD-10-CM

## 2023-05-18 PROCEDURE — 76856 US EXAM PELVIC COMPLETE: CPT | Mod: S$GLB,,, | Performed by: STUDENT IN AN ORGANIZED HEALTH CARE EDUCATION/TRAINING PROGRAM

## 2023-05-18 PROCEDURE — 76856 US OB/GYN EXTENDED PROCEDURE (VIEWPOINT): ICD-10-PCS | Mod: S$GLB,,, | Performed by: STUDENT IN AN ORGANIZED HEALTH CARE EDUCATION/TRAINING PROGRAM

## 2023-05-24 ENCOUNTER — PATIENT MESSAGE (OUTPATIENT)
Dept: OBSTETRICS AND GYNECOLOGY | Facility: CLINIC | Age: 28
End: 2023-05-24
Payer: MEDICAID

## 2023-05-26 ENCOUNTER — PATIENT MESSAGE (OUTPATIENT)
Dept: OBSTETRICS AND GYNECOLOGY | Facility: CLINIC | Age: 28
End: 2023-05-26
Payer: MEDICAID

## 2023-05-30 ENCOUNTER — PATIENT MESSAGE (OUTPATIENT)
Dept: OBSTETRICS AND GYNECOLOGY | Facility: CLINIC | Age: 28
End: 2023-05-30
Payer: MEDICAID

## 2023-06-15 ENCOUNTER — PATIENT MESSAGE (OUTPATIENT)
Dept: OBSTETRICS AND GYNECOLOGY | Facility: CLINIC | Age: 28
End: 2023-06-15
Payer: MEDICAID

## 2023-06-15 RX ORDER — BUPROPION HYDROCHLORIDE 150 MG/1
150 TABLET ORAL EVERY MORNING
Qty: 30 TABLET | Refills: 2 | Status: SHIPPED | OUTPATIENT
Start: 2023-06-15 | End: 2023-09-18 | Stop reason: SDUPTHER

## 2023-06-27 ENCOUNTER — OFFICE VISIT (OUTPATIENT)
Dept: OBSTETRICS AND GYNECOLOGY | Facility: CLINIC | Age: 28
End: 2023-06-27
Payer: MEDICAID

## 2023-06-27 VITALS
WEIGHT: 173.5 LBS | HEIGHT: 66 IN | SYSTOLIC BLOOD PRESSURE: 128 MMHG | BODY MASS INDEX: 27.88 KG/M2 | HEART RATE: 84 BPM | DIASTOLIC BLOOD PRESSURE: 85 MMHG

## 2023-06-27 DIAGNOSIS — B96.89 BV (BACTERIAL VAGINOSIS): Primary | ICD-10-CM

## 2023-06-27 DIAGNOSIS — N76.0 BV (BACTERIAL VAGINOSIS): Primary | ICD-10-CM

## 2023-06-27 PROCEDURE — 1159F MED LIST DOCD IN RCRD: CPT | Mod: CPTII,S$GLB,, | Performed by: NURSE PRACTITIONER

## 2023-06-27 PROCEDURE — 1159F PR MEDICATION LIST DOCUMENTED IN MEDICAL RECORD: ICD-10-PCS | Mod: CPTII,S$GLB,, | Performed by: NURSE PRACTITIONER

## 2023-06-27 PROCEDURE — 3074F SYST BP LT 130 MM HG: CPT | Mod: CPTII,S$GLB,, | Performed by: NURSE PRACTITIONER

## 2023-06-27 PROCEDURE — 99213 PR OFFICE/OUTPT VISIT, EST, LEVL III, 20-29 MIN: ICD-10-PCS | Mod: S$GLB,,, | Performed by: NURSE PRACTITIONER

## 2023-06-27 PROCEDURE — 99213 OFFICE O/P EST LOW 20 MIN: CPT | Mod: S$GLB,,, | Performed by: NURSE PRACTITIONER

## 2023-06-27 PROCEDURE — 3008F BODY MASS INDEX DOCD: CPT | Mod: CPTII,S$GLB,, | Performed by: NURSE PRACTITIONER

## 2023-06-27 PROCEDURE — 3008F PR BODY MASS INDEX (BMI) DOCUMENTED: ICD-10-PCS | Mod: CPTII,S$GLB,, | Performed by: NURSE PRACTITIONER

## 2023-06-27 PROCEDURE — 3079F DIAST BP 80-89 MM HG: CPT | Mod: CPTII,S$GLB,, | Performed by: NURSE PRACTITIONER

## 2023-06-27 PROCEDURE — 3079F PR MOST RECENT DIASTOLIC BLOOD PRESSURE 80-89 MM HG: ICD-10-PCS | Mod: CPTII,S$GLB,, | Performed by: NURSE PRACTITIONER

## 2023-06-27 PROCEDURE — 3074F PR MOST RECENT SYSTOLIC BLOOD PRESSURE < 130 MM HG: ICD-10-PCS | Mod: CPTII,S$GLB,, | Performed by: NURSE PRACTITIONER

## 2023-06-27 RX ORDER — METRONIDAZOLE 500 MG/1
500 TABLET ORAL 2 TIMES DAILY
Qty: 14 TABLET | Refills: 0 | Status: SHIPPED | OUTPATIENT
Start: 2023-06-27 | End: 2023-07-04

## 2023-06-27 NOTE — PROGRESS NOTES
"  CC: Vaginal discharge    Johanna Iverson is a 28 y.o. female  presents with complaint of vaginal discharge for the last week. Not taking OCP     Past Medical History:   Diagnosis Date    Asthma     Gestational diabetes mellitus (GDM) affecting third pregnancy     Gestational hypertension 2021     Past Surgical History:   Procedure Laterality Date     SECTION      x 2     Social History     Tobacco Use    Smoking status: Never    Smokeless tobacco: Never   Substance Use Topics    Alcohol use: Never    Drug use: Never     Family History   Problem Relation Age of Onset    Breast cancer Maternal Aunt      OB History    Para Term  AB Living   3 3 2     2   SAB IAB Ectopic Multiple Live Births           2      # Outcome Date GA Lbr Miquel/2nd Weight Sex Delivery Anes PTL Lv   3 Term 21 37w0d  2.88 kg (6 lb 5.6 oz) M CS-LTranv         Complications: Gestational HTN   2 Para 19    M CS-LTranv  N JOHANNA   1 Term 10/13/15   2.92 kg (6 lb 7 oz) M CS-LTranv Spinal N JOHANNA      Complications: Breech birth         ROS:  GENERAL: No fever, chills, fatigability or weight loss.  VULVAR: No pain, no lesions and no itching.  VAGINAL: No relaxation, no itching, + discharge, no abnormal bleeding and no lesions.  ABDOMEN: No abdominal pain. Denies nausea. Denies vomiting. No diarrhea. No constipation  BREAST: Denies pain. No lumps. No discharge.  URINARY: No incontinence, no nocturia, no frequency and no dysuria.  CARDIOVASCULAR: No chest pain. No shortness of breath. No leg cramps.  NEUROLOGICAL: No headaches. No vision changes.      /85   Pulse 84   Ht 5' 6" (1.676 m)   Wt 78.7 kg (173 lb 8 oz)   LMP 2023 (Exact Date)   Breastfeeding No   BMI 28.00 kg/m²     PHYSICAL EXAM:  VULVA: normal appearing vulva with no masses, tenderness or lesions, VAGINA: normal appearing vagina  bloody discharge, no lesions, CERVIX: normal appearing cervix without discharge or lesions, WET MOUNT done - " results: clue cells    ASSESSMENT and PLAN:    ICD-10-CM ICD-9-CM    1. BV (bacterial vaginosis)  N76.0 616.10 metroNIDAZOLE (FLAGYL) 500 MG tablet    B96.89 041.9             Patient was counseled today on vaginitis prevention including :  a. avoiding feminine products such as deoderant soaps, body wash, bubble bath, douches, scented toilet paper, deoderant tampons or pads, feminine wipes, chronic pad use, etc.  b. avoiding other vulvovaginal irritants such as long hot baths, humidity, tight, synthetic clothing, chlorine and sitting around in wet bathing suits  c. wearing cotton underwear, avoiding thong underwear and no underwear to bed  d. taking showers instead of baths and use a hair dryer on cool setting afterwards to dry  e. wearing cotton to exercise and shower immediately after exercise and change clothes  f. using polyurethane condoms without spermicide if sexually active and symptoms are triggered by intercourse  Encouraged to start OCP for cycle regulation     FOLLOW UP: PRN lack of improvement.

## 2023-07-10 ENCOUNTER — PATIENT MESSAGE (OUTPATIENT)
Dept: OBSTETRICS AND GYNECOLOGY | Facility: CLINIC | Age: 28
End: 2023-07-10
Payer: MEDICAID

## 2023-07-10 DIAGNOSIS — Z30.09 FAMILY PLANNING: Primary | ICD-10-CM

## 2023-07-10 RX ORDER — NORETHINDRONE ACETATE AND ETHINYL ESTRADIOL, ETHINYL ESTRADIOL AND FERROUS FUMARATE 1MG-10(24)
1 KIT ORAL DAILY
Qty: 84 TABLET | Refills: 3 | Status: SHIPPED | OUTPATIENT
Start: 2023-07-10 | End: 2023-07-14 | Stop reason: SDUPTHER

## 2023-07-14 DIAGNOSIS — Z30.09 FAMILY PLANNING: ICD-10-CM

## 2023-07-17 RX ORDER — NORETHINDRONE ACETATE AND ETHINYL ESTRADIOL, ETHINYL ESTRADIOL AND FERROUS FUMARATE 1MG-10(24)
1 KIT ORAL DAILY
Qty: 84 TABLET | Refills: 3 | Status: SHIPPED | OUTPATIENT
Start: 2023-07-17 | End: 2023-08-02 | Stop reason: SDUPTHER

## 2023-08-02 ENCOUNTER — OFFICE VISIT (OUTPATIENT)
Dept: OBSTETRICS AND GYNECOLOGY | Facility: CLINIC | Age: 28
End: 2023-08-02
Payer: MEDICAID

## 2023-08-02 VITALS
WEIGHT: 175.25 LBS | DIASTOLIC BLOOD PRESSURE: 84 MMHG | SYSTOLIC BLOOD PRESSURE: 132 MMHG | HEIGHT: 66 IN | HEART RATE: 93 BPM | BODY MASS INDEX: 28.16 KG/M2

## 2023-08-02 DIAGNOSIS — Z30.09 FAMILY PLANNING: ICD-10-CM

## 2023-08-02 PROCEDURE — 99213 PR OFFICE/OUTPT VISIT, EST, LEVL III, 20-29 MIN: ICD-10-PCS | Mod: S$GLB,,, | Performed by: NURSE PRACTITIONER

## 2023-08-02 PROCEDURE — 1160F PR REVIEW ALL MEDS BY PRESCRIBER/CLIN PHARMACIST DOCUMENTED: ICD-10-PCS | Mod: CPTII,S$GLB,, | Performed by: NURSE PRACTITIONER

## 2023-08-02 PROCEDURE — 1159F MED LIST DOCD IN RCRD: CPT | Mod: CPTII,S$GLB,, | Performed by: NURSE PRACTITIONER

## 2023-08-02 PROCEDURE — 3075F SYST BP GE 130 - 139MM HG: CPT | Mod: CPTII,S$GLB,, | Performed by: NURSE PRACTITIONER

## 2023-08-02 PROCEDURE — 3008F PR BODY MASS INDEX (BMI) DOCUMENTED: ICD-10-PCS | Mod: CPTII,S$GLB,, | Performed by: NURSE PRACTITIONER

## 2023-08-02 PROCEDURE — 3008F BODY MASS INDEX DOCD: CPT | Mod: CPTII,S$GLB,, | Performed by: NURSE PRACTITIONER

## 2023-08-02 PROCEDURE — 3079F PR MOST RECENT DIASTOLIC BLOOD PRESSURE 80-89 MM HG: ICD-10-PCS | Mod: CPTII,S$GLB,, | Performed by: NURSE PRACTITIONER

## 2023-08-02 PROCEDURE — 1160F RVW MEDS BY RX/DR IN RCRD: CPT | Mod: CPTII,S$GLB,, | Performed by: NURSE PRACTITIONER

## 2023-08-02 PROCEDURE — 3075F PR MOST RECENT SYSTOLIC BLOOD PRESS GE 130-139MM HG: ICD-10-PCS | Mod: CPTII,S$GLB,, | Performed by: NURSE PRACTITIONER

## 2023-08-02 PROCEDURE — 1159F PR MEDICATION LIST DOCUMENTED IN MEDICAL RECORD: ICD-10-PCS | Mod: CPTII,S$GLB,, | Performed by: NURSE PRACTITIONER

## 2023-08-02 PROCEDURE — 99213 OFFICE O/P EST LOW 20 MIN: CPT | Mod: S$GLB,,, | Performed by: NURSE PRACTITIONER

## 2023-08-02 PROCEDURE — 3079F DIAST BP 80-89 MM HG: CPT | Mod: CPTII,S$GLB,, | Performed by: NURSE PRACTITIONER

## 2023-08-02 RX ORDER — NORETHINDRONE ACETATE AND ETHINYL ESTRADIOL, ETHINYL ESTRADIOL AND FERROUS FUMARATE 1MG-10(24)
1 KIT ORAL DAILY
Qty: 84 TABLET | Refills: 3 | Status: SHIPPED | OUTPATIENT
Start: 2023-08-02 | End: 2023-10-17

## 2023-08-02 NOTE — PROGRESS NOTES
Chief Complaint:  Follow-up contraception       History of Present Illness   Presents for contraception follow up.  Currently using lo loestrin and AUB has stopped  Reports some postcoital bleeding.     OB History          3    Para   3    Term   2            AB        Living   2         SAB        IAB        Ectopic        Multiple        Live Births   2                    No LMP recorded (lmp unknown). (Menstrual status: Birth Control).     Vitals:    23 1009   BP: 132/84   Pulse: 93          Review of Systems   Constitutional:  Negative for chills and fever.   Respiratory:  Negative for shortness of breath.    Cardiovascular:  Negative for chest pain.   Gastrointestinal:  Negative for abdominal pain, blood in stool, constipation, diarrhea, nausea, vomiting and reflux.   Genitourinary:  Positive for postcoital bleeding. Negative for dysmenorrhea, dyspareunia, dysuria, hematuria, hot flashes, menorrhagia, menstrual problem, pelvic pain, vaginal bleeding, vaginal discharge and vaginal dryness.   Musculoskeletal:  Negative for arthralgias and joint swelling.   Integumentary:  Negative for rash, hair changes, breast mass, nipple discharge and breast skin changes.   Psychiatric/Behavioral:  Negative for depression. The patient is not nervous/anxious.    Breast: Negative for asymmetry, lump, mass, nipple discharge and skin changes           Physical Exam:   Constitutional: She is oriented to person, place, and time. She appears well-developed and well-nourished.    HENT:   Head: Normocephalic and atraumatic.    Eyes: Pupils are equal, round, and reactive to light. Conjunctivae are normal.    Neck: No thyromegaly present.    Cardiovascular:  Normal rate.             Pulmonary/Chest: Effort normal and breath sounds normal.        Abdominal: Soft.             Musculoskeletal: Normal range of motion and moves all extremeties.       Neurological: She is alert and oriented to person, place, and time. She  has normal reflexes.    Skin: Skin is warm and dry.    Psychiatric: She has a normal mood and affect. Her behavior is normal. Judgment and thought content normal.          Assessment:     1. Family planning             Plan:   Family planning  -     norethindrone-e.estradioL-iron (LO LOESTRIN FE) 1 mg-10 mcg (24)/10 mcg (2) Tab; Take 1 tablet by mouth once daily.  Dispense: 84 tablet; Refill: 3        OTC Women's daily Multi vitamin  Healthy diet, exercise and lifestyle discussed  Risk assessment for inherited gyn cancer done         Follow up in about 9 months (around 5/2/2024) for [facility].

## 2023-08-09 ENCOUNTER — PATIENT MESSAGE (OUTPATIENT)
Dept: OBSTETRICS AND GYNECOLOGY | Facility: CLINIC | Age: 28
End: 2023-08-09
Payer: MEDICAID

## 2023-08-16 DIAGNOSIS — N91.2 AMENORRHEA: Primary | ICD-10-CM

## 2023-08-17 LAB — B-HCG SERPL-ACNC: <1.59 MIU/ML

## 2023-08-24 ENCOUNTER — PATIENT MESSAGE (OUTPATIENT)
Dept: OBSTETRICS AND GYNECOLOGY | Facility: CLINIC | Age: 28
End: 2023-08-24
Payer: MEDICAID

## 2023-08-25 RX ORDER — METRONIDAZOLE 500 MG/1
500 TABLET ORAL EVERY 12 HOURS
Qty: 14 TABLET | Refills: 0 | Status: SHIPPED | OUTPATIENT
Start: 2023-08-25 | End: 2023-09-01

## 2023-09-18 RX ORDER — BUPROPION HYDROCHLORIDE 150 MG/1
150 TABLET ORAL EVERY MORNING
Qty: 30 TABLET | Refills: 2 | Status: SHIPPED | OUTPATIENT
Start: 2023-09-18 | End: 2024-09-17

## 2023-10-17 ENCOUNTER — OFFICE VISIT (OUTPATIENT)
Dept: OBSTETRICS AND GYNECOLOGY | Facility: CLINIC | Age: 28
End: 2023-10-17
Payer: MEDICAID

## 2023-10-17 VITALS
HEART RATE: 91 BPM | BODY MASS INDEX: 28.77 KG/M2 | SYSTOLIC BLOOD PRESSURE: 125 MMHG | WEIGHT: 179 LBS | HEIGHT: 66 IN | DIASTOLIC BLOOD PRESSURE: 78 MMHG

## 2023-10-17 DIAGNOSIS — N93.9 ABNORMAL UTERINE BLEEDING (AUB): Primary | ICD-10-CM

## 2023-10-17 DIAGNOSIS — Z11.3 VENEREAL DISEASE SCREENING: ICD-10-CM

## 2023-10-17 DIAGNOSIS — R87.610 ASCUS OF CERVIX WITH NEGATIVE HIGH RISK HPV: ICD-10-CM

## 2023-10-17 DIAGNOSIS — N76.0 VAGINAL INFECTION: ICD-10-CM

## 2023-10-17 PROCEDURE — 1159F MED LIST DOCD IN RCRD: CPT | Mod: CPTII,S$GLB,, | Performed by: NURSE PRACTITIONER

## 2023-10-17 PROCEDURE — 3074F PR MOST RECENT SYSTOLIC BLOOD PRESSURE < 130 MM HG: ICD-10-PCS | Mod: CPTII,S$GLB,, | Performed by: NURSE PRACTITIONER

## 2023-10-17 PROCEDURE — 3008F BODY MASS INDEX DOCD: CPT | Mod: CPTII,S$GLB,, | Performed by: NURSE PRACTITIONER

## 2023-10-17 PROCEDURE — 99213 PR OFFICE/OUTPT VISIT, EST, LEVL III, 20-29 MIN: ICD-10-PCS | Mod: S$GLB,,, | Performed by: NURSE PRACTITIONER

## 2023-10-17 PROCEDURE — 1160F PR REVIEW ALL MEDS BY PRESCRIBER/CLIN PHARMACIST DOCUMENTED: ICD-10-PCS | Mod: CPTII,S$GLB,, | Performed by: NURSE PRACTITIONER

## 2023-10-17 PROCEDURE — 3074F SYST BP LT 130 MM HG: CPT | Mod: CPTII,S$GLB,, | Performed by: NURSE PRACTITIONER

## 2023-10-17 PROCEDURE — 3008F PR BODY MASS INDEX (BMI) DOCUMENTED: ICD-10-PCS | Mod: CPTII,S$GLB,, | Performed by: NURSE PRACTITIONER

## 2023-10-17 PROCEDURE — 1160F RVW MEDS BY RX/DR IN RCRD: CPT | Mod: CPTII,S$GLB,, | Performed by: NURSE PRACTITIONER

## 2023-10-17 PROCEDURE — 99213 OFFICE O/P EST LOW 20 MIN: CPT | Mod: S$GLB,,, | Performed by: NURSE PRACTITIONER

## 2023-10-17 PROCEDURE — 3078F PR MOST RECENT DIASTOLIC BLOOD PRESSURE < 80 MM HG: ICD-10-PCS | Mod: CPTII,S$GLB,, | Performed by: NURSE PRACTITIONER

## 2023-10-17 PROCEDURE — 1159F PR MEDICATION LIST DOCUMENTED IN MEDICAL RECORD: ICD-10-PCS | Mod: CPTII,S$GLB,, | Performed by: NURSE PRACTITIONER

## 2023-10-17 PROCEDURE — 3078F DIAST BP <80 MM HG: CPT | Mod: CPTII,S$GLB,, | Performed by: NURSE PRACTITIONER

## 2023-10-17 RX ORDER — DROSPIRENONE AND ESTETROL 3-14.2(28)
1 KIT ORAL DAILY
Qty: 28 TABLET | Refills: 3 | Status: SHIPPED | OUTPATIENT
Start: 2023-10-17

## 2023-10-17 NOTE — PROGRESS NOTES
"    Subjective:       Patient ID: Johanna Iverson is a 28 y.o. female.    Chief Complaint:  Vaginal Bleeding and STD CHECK      History of Present Illness   Presents for AUB and PCB.   No relief with othro cyclen/ ortho evra, or lo loestrin.   Sexually active with   Normal pelvic u/s  No LMP recorded (lmp unknown). (Menstrual status: Birth Control).        OB History          3    Para   3    Term   2            AB        Living   3         SAB        IAB        Ectopic        Multiple        Live Births   3                  Review of Systems  Review of Systems   Constitutional:  Negative for chills and fever.   Respiratory:  Negative for shortness of breath.    Cardiovascular:  Negative for chest pain.   Gastrointestinal:  Negative for abdominal pain, blood in stool, constipation, diarrhea, nausea, vomiting and reflux.   Genitourinary:  Positive for dyspareunia, menorrhagia, menstrual problem, pelvic pain, vaginal bleeding and postcoital bleeding. Negative for dysmenorrhea, dysuria, hematuria, hot flashes, vaginal discharge and vaginal dryness.   Musculoskeletal:  Negative for arthralgias and joint swelling.   Integumentary:  Negative for rash, hair changes, breast mass, nipple discharge and breast skin changes.   Psychiatric/Behavioral:  Negative for depression. The patient is not nervous/anxious.    Breast: Negative for asymmetry, lump, mass, nipple discharge and skin changes          Objective:     Vitals:    10/17/23 1120   BP: 125/78   Pulse: 91   Weight: 81.2 kg (179 lb)   Height: 5' 6" (1.676 m)        Physical Exam:   Constitutional: She is oriented to person, place, and time. She appears well-developed and well-nourished.    HENT:   Head: Normocephalic and atraumatic.     Neck: No thyromegaly present.    Cardiovascular:  Normal rate.             Pulmonary/Chest: Effort normal. No respiratory distress.        Abdominal: Soft. There is no abdominal tenderness.     Genitourinary:    " Inguinal canal, vagina, uterus, right adnexa and left adnexa normal.      Pelvic exam was performed with patient supine.   The external female genitalia was normal.   Genitalia hair distrobution normal .   Labial bartholins normal.Cervix is normal. Cervix exhibits friability.    pap smear completedUterus consistancy normal. and Uerus contour normal  Uterus is not tender.           Musculoskeletal: Moves all extremeties.       Neurological: She is alert and oriented to person, place, and time.    Skin: Skin is warm and dry.    Psychiatric: She has a normal mood and affect. Her behavior is normal. Judgment and thought content normal.          Assessment:     1. Abnormal uterine bleeding (AUB)    2. Venereal disease screening    3. ASCUS of cervix with negative high risk HPV    4. Vaginal infection              Plan:       Abnormal uterine bleeding (AUB)  -     drospirenone-estetrol (NEXTSTELLIS) 3 mg- 14.2 mg (28) Tab; Take 1 tablet by mouth Daily.  Dispense: 28 tablet; Refill: 3    Venereal disease screening  -     C. trachomatis/N. gonorrhoeae by AMP DNA Other; Cervicovaginal  -     Trichomonas Vaginalis, SUE    ASCUS of cervix with negative high risk HPV  -     Liquid-based pap smear, screening    Vaginal infection  -     Trichomonas Vaginalis, SUE       Nextstellis  Pt is interested in sterilization   Follow up for Appt with Dr Mckeon for  surgical consults.

## 2023-10-18 LAB — Lab: NORMAL

## 2023-11-02 ENCOUNTER — OFFICE VISIT (OUTPATIENT)
Dept: OBSTETRICS AND GYNECOLOGY | Facility: CLINIC | Age: 28
End: 2023-11-02
Payer: MEDICAID

## 2023-11-02 VITALS
HEIGHT: 66 IN | DIASTOLIC BLOOD PRESSURE: 80 MMHG | BODY MASS INDEX: 27.8 KG/M2 | SYSTOLIC BLOOD PRESSURE: 115 MMHG | HEART RATE: 83 BPM | WEIGHT: 173 LBS

## 2023-11-02 DIAGNOSIS — Z30.014 ENCOUNTER FOR INITIAL PRESCRIPTION OF INTRAUTERINE CONTRACEPTIVE DEVICE (IUD): Primary | ICD-10-CM

## 2023-11-02 DIAGNOSIS — R10.2 PELVIC PAIN: ICD-10-CM

## 2023-11-02 DIAGNOSIS — N93.9 ABNORMAL UTERINE BLEEDING (AUB): ICD-10-CM

## 2023-11-02 PROCEDURE — 3008F PR BODY MASS INDEX (BMI) DOCUMENTED: ICD-10-PCS | Mod: CPTII,S$GLB,, | Performed by: STUDENT IN AN ORGANIZED HEALTH CARE EDUCATION/TRAINING PROGRAM

## 2023-11-02 PROCEDURE — 3074F SYST BP LT 130 MM HG: CPT | Mod: CPTII,S$GLB,, | Performed by: STUDENT IN AN ORGANIZED HEALTH CARE EDUCATION/TRAINING PROGRAM

## 2023-11-02 PROCEDURE — 3074F PR MOST RECENT SYSTOLIC BLOOD PRESSURE < 130 MM HG: ICD-10-PCS | Mod: CPTII,S$GLB,, | Performed by: STUDENT IN AN ORGANIZED HEALTH CARE EDUCATION/TRAINING PROGRAM

## 2023-11-02 PROCEDURE — 1159F MED LIST DOCD IN RCRD: CPT | Mod: CPTII,S$GLB,, | Performed by: STUDENT IN AN ORGANIZED HEALTH CARE EDUCATION/TRAINING PROGRAM

## 2023-11-02 PROCEDURE — 3079F DIAST BP 80-89 MM HG: CPT | Mod: CPTII,S$GLB,, | Performed by: STUDENT IN AN ORGANIZED HEALTH CARE EDUCATION/TRAINING PROGRAM

## 2023-11-02 PROCEDURE — 99213 PR OFFICE/OUTPT VISIT, EST, LEVL III, 20-29 MIN: ICD-10-PCS | Mod: S$GLB,,, | Performed by: STUDENT IN AN ORGANIZED HEALTH CARE EDUCATION/TRAINING PROGRAM

## 2023-11-02 PROCEDURE — 3008F BODY MASS INDEX DOCD: CPT | Mod: CPTII,S$GLB,, | Performed by: STUDENT IN AN ORGANIZED HEALTH CARE EDUCATION/TRAINING PROGRAM

## 2023-11-02 PROCEDURE — 99213 OFFICE O/P EST LOW 20 MIN: CPT | Mod: S$GLB,,, | Performed by: STUDENT IN AN ORGANIZED HEALTH CARE EDUCATION/TRAINING PROGRAM

## 2023-11-02 PROCEDURE — 3079F PR MOST RECENT DIASTOLIC BLOOD PRESSURE 80-89 MM HG: ICD-10-PCS | Mod: CPTII,S$GLB,, | Performed by: STUDENT IN AN ORGANIZED HEALTH CARE EDUCATION/TRAINING PROGRAM

## 2023-11-02 PROCEDURE — 1159F PR MEDICATION LIST DOCUMENTED IN MEDICAL RECORD: ICD-10-PCS | Mod: CPTII,S$GLB,, | Performed by: STUDENT IN AN ORGANIZED HEALTH CARE EDUCATION/TRAINING PROGRAM

## 2023-11-02 PROCEDURE — 1160F PR REVIEW ALL MEDS BY PRESCRIBER/CLIN PHARMACIST DOCUMENTED: ICD-10-PCS | Mod: CPTII,S$GLB,, | Performed by: STUDENT IN AN ORGANIZED HEALTH CARE EDUCATION/TRAINING PROGRAM

## 2023-11-02 PROCEDURE — 1160F RVW MEDS BY RX/DR IN RCRD: CPT | Mod: CPTII,S$GLB,, | Performed by: STUDENT IN AN ORGANIZED HEALTH CARE EDUCATION/TRAINING PROGRAM

## 2023-11-02 NOTE — PROGRESS NOTES
"Chief Complaint: contraception, AUB    HPI: Patient is a 28 y.o. y.o. female  who presents to GYN clinic for follow up.  Patient currently on pill for contraception.  She reports she has difficulty remembering to take a pill, she is also reporting some abnormal uterine bleeding while taking OCPs.  She is considering sterilization with ablation however she would like to hold off until a later time.  She is interested in changing to the IUD at this time.  We discussed different types of IUDs.  She is electing for the 5 year hormonal IUD.  She does also report some lower abdominal pain as well, she reports history of ovarian cyst.  She has no other complaints today.  Review of systems negative unless mentioned above.    Physical Exam:  Vitals:    23 1016   BP: 115/80   Pulse: 83   Weight: 78.5 kg (173 lb)   Height: 5' 6" (1.676 m)   Body mass index is 27.92 kg/m².    Gen: NAD, well developed, well nourished  Psych: alert and oriented x 3, normal affect  HEENT: normocephalic, atraumatic  Abd: soft, non-tender, non-distended  Skin: Warm and dry  Ext: no c/c/c, moves all extremities  Neurological: normal gait, gross motor function intact    Assessment: Patient is a 28 y.o. y.o. female  with  Patient Active Problem List   Diagnosis    History of     Abnormal uterine bleeding (AUB)       Plan:  Paperwork filled out today for the Kyleena IUD  Will notify patient of arrival scheduled for insertion   We did discuss surgical options however patient is not 100% on being done with childbearing   Discuss side effects associated with the IUD, patient voiced understanding   Pelvic ultrasound ordered today  Return to clinic for IUD insertion    Lio Mckeon MD        "

## 2023-11-14 ENCOUNTER — PROCEDURE VISIT (OUTPATIENT)
Dept: OBSTETRICS AND GYNECOLOGY | Facility: CLINIC | Age: 28
End: 2023-11-14
Payer: MEDICAID

## 2023-11-14 ENCOUNTER — OFFICE VISIT (OUTPATIENT)
Dept: OBSTETRICS AND GYNECOLOGY | Facility: CLINIC | Age: 28
End: 2023-11-14
Payer: MEDICAID

## 2023-11-14 VITALS
HEIGHT: 66 IN | HEART RATE: 102 BPM | WEIGHT: 172 LBS | BODY MASS INDEX: 27.64 KG/M2 | SYSTOLIC BLOOD PRESSURE: 144 MMHG | DIASTOLIC BLOOD PRESSURE: 89 MMHG

## 2023-11-14 DIAGNOSIS — N93.9 ABNORMAL UTERINE BLEEDING (AUB): ICD-10-CM

## 2023-11-14 DIAGNOSIS — N93.9 ABNORMAL UTERINE BLEEDING (AUB): Primary | ICD-10-CM

## 2023-11-14 PROCEDURE — 3008F PR BODY MASS INDEX (BMI) DOCUMENTED: ICD-10-PCS | Mod: CPTII,S$GLB,, | Performed by: STUDENT IN AN ORGANIZED HEALTH CARE EDUCATION/TRAINING PROGRAM

## 2023-11-14 PROCEDURE — 1160F RVW MEDS BY RX/DR IN RCRD: CPT | Mod: CPTII,S$GLB,, | Performed by: STUDENT IN AN ORGANIZED HEALTH CARE EDUCATION/TRAINING PROGRAM

## 2023-11-14 PROCEDURE — 3008F BODY MASS INDEX DOCD: CPT | Mod: CPTII,S$GLB,, | Performed by: STUDENT IN AN ORGANIZED HEALTH CARE EDUCATION/TRAINING PROGRAM

## 2023-11-14 PROCEDURE — 1159F MED LIST DOCD IN RCRD: CPT | Mod: CPTII,S$GLB,, | Performed by: STUDENT IN AN ORGANIZED HEALTH CARE EDUCATION/TRAINING PROGRAM

## 2023-11-14 PROCEDURE — 3079F DIAST BP 80-89 MM HG: CPT | Mod: CPTII,S$GLB,, | Performed by: STUDENT IN AN ORGANIZED HEALTH CARE EDUCATION/TRAINING PROGRAM

## 2023-11-14 PROCEDURE — 3077F SYST BP >= 140 MM HG: CPT | Mod: CPTII,S$GLB,, | Performed by: STUDENT IN AN ORGANIZED HEALTH CARE EDUCATION/TRAINING PROGRAM

## 2023-11-14 PROCEDURE — 3079F PR MOST RECENT DIASTOLIC BLOOD PRESSURE 80-89 MM HG: ICD-10-PCS | Mod: CPTII,S$GLB,, | Performed by: STUDENT IN AN ORGANIZED HEALTH CARE EDUCATION/TRAINING PROGRAM

## 2023-11-14 PROCEDURE — 1159F PR MEDICATION LIST DOCUMENTED IN MEDICAL RECORD: ICD-10-PCS | Mod: CPTII,S$GLB,, | Performed by: STUDENT IN AN ORGANIZED HEALTH CARE EDUCATION/TRAINING PROGRAM

## 2023-11-14 PROCEDURE — 1160F PR REVIEW ALL MEDS BY PRESCRIBER/CLIN PHARMACIST DOCUMENTED: ICD-10-PCS | Mod: CPTII,S$GLB,, | Performed by: STUDENT IN AN ORGANIZED HEALTH CARE EDUCATION/TRAINING PROGRAM

## 2023-11-14 PROCEDURE — 76856 US OB/GYN PROCEDURE (VIEWPOINT): ICD-10-PCS | Mod: S$GLB,,, | Performed by: STUDENT IN AN ORGANIZED HEALTH CARE EDUCATION/TRAINING PROGRAM

## 2023-11-14 PROCEDURE — 99213 PR OFFICE/OUTPT VISIT, EST, LEVL III, 20-29 MIN: ICD-10-PCS | Mod: S$GLB,,, | Performed by: STUDENT IN AN ORGANIZED HEALTH CARE EDUCATION/TRAINING PROGRAM

## 2023-11-14 PROCEDURE — 76856 US EXAM PELVIC COMPLETE: CPT | Mod: S$GLB,,, | Performed by: STUDENT IN AN ORGANIZED HEALTH CARE EDUCATION/TRAINING PROGRAM

## 2023-11-14 PROCEDURE — 99213 OFFICE O/P EST LOW 20 MIN: CPT | Mod: S$GLB,,, | Performed by: STUDENT IN AN ORGANIZED HEALTH CARE EDUCATION/TRAINING PROGRAM

## 2023-11-14 PROCEDURE — 3077F PR MOST RECENT SYSTOLIC BLOOD PRESSURE >= 140 MM HG: ICD-10-PCS | Mod: CPTII,S$GLB,, | Performed by: STUDENT IN AN ORGANIZED HEALTH CARE EDUCATION/TRAINING PROGRAM

## 2023-11-14 NOTE — PROGRESS NOTES
"Chief Complaint: AUB    HPI: Patient is a 28 y.o. y.o. female  who presents to GYN clinic for follow up.  Patient with a history of abnormal uterine bleeding, she would a pelvic ultrasound performed, normal uterus and ovaries on ultrasound.  She does have IUD ordered, awaiting for arrival.  She has no other complaints today.  Review of systems negative unless mentioned above..    Physical Exam:  Vitals:    23 0919   BP: (!) 144/89   Pulse: 102   Weight: 78 kg (172 lb)   Height: 5' 6" (1.676 m)   Body mass index is 27.76 kg/m².    Gen: NAD, well developed, well nourished  Psych: alert and oriented x 3, normal affect  HEENT: normocephalic, atraumatic  Abd: soft, non-tender, non-distended  Skin: warm and dry  Ext: no c/c/c, moves all extremities  Neurological: normal gait, gross motor function intact    Results:  Pelvic US:  Uterus   ======   Uterus: Visualized   Uterus position: anteverted   Uterus length 72 mm   Uterus width 48 mm   Uterus height 41 mm   Uterus Vol 74.9 cmÂ³   Endometrial thickness, total 2.6 mm     Right Ovary   =========   Rt ovary: Visualized   Rt ovary D1 29 mm   Rt ovary D2 18 mm   Rt ovary D3 23 mm   Rt ovary Vol 6.1 cmÂ³     Left Ovary   ========   Lt ovary: Visualized   Lt ovary D1 23 mm   Lt ovary D2 17 mm   Lt ovary D3 21 mm   Lt ovary Vol 4.2 cmÂ³     Impression   =========   Uterus and ovaries appear wnls.     Assessment: Patient is a 28 y.o. y.o. female  with  Patient Active Problem List   Diagnosis    History of     Abnormal uterine bleeding (AUB)       Plan:  Reviewed ultrasound results with patient today, patient voices understanding   Patient does have an IUD ordered, after arrival notify patient   Patient to return to clinic for IUD insertion    Lio Mckeon MD      "

## 2023-11-15 ENCOUNTER — TELEPHONE (OUTPATIENT)
Dept: OBSTETRICS AND GYNECOLOGY | Facility: CLINIC | Age: 28
End: 2023-11-15
Payer: MEDICAID

## 2023-11-15 NOTE — TELEPHONE ENCOUNTER
Patient scheduled for IUD insertion. Patient v/u    ----- Message from Missy White sent at 11/15/2023  3:03 PM CST -----  Contact: Patient  Type:  Patient Returning Call    Who Called:Johanna Iverson  Who Left Message for Patient:Murali   Does the patient know what this is regarding?: schedule IUD appointment   Would the patient rather a call back or a response via [facility]? Call back   Best Call Back Number:[phone]  Additional Information:

## 2023-12-21 ENCOUNTER — PATIENT MESSAGE (OUTPATIENT)
Dept: OBSTETRICS AND GYNECOLOGY | Facility: CLINIC | Age: 28
End: 2023-12-21
Payer: MEDICAID

## 2023-12-21 DIAGNOSIS — N76.0 VAGINAL INFECTION: Primary | ICD-10-CM

## 2023-12-21 RX ORDER — METRONIDAZOLE 500 MG/1
500 TABLET ORAL 2 TIMES DAILY
Qty: 14 TABLET | Refills: 0 | Status: SHIPPED | OUTPATIENT
Start: 2023-12-21 | End: 2023-12-28

## 2024-01-30 ENCOUNTER — PATIENT MESSAGE (OUTPATIENT)
Dept: OBSTETRICS AND GYNECOLOGY | Facility: CLINIC | Age: 29
End: 2024-01-30
Payer: MEDICAID

## 2024-01-31 DIAGNOSIS — B96.89 BV (BACTERIAL VAGINOSIS): Primary | ICD-10-CM

## 2024-01-31 DIAGNOSIS — N76.0 BV (BACTERIAL VAGINOSIS): Primary | ICD-10-CM

## 2024-01-31 RX ORDER — METRONIDAZOLE 500 MG/1
500 TABLET ORAL 2 TIMES DAILY
Qty: 14 TABLET | Refills: 0 | Status: SHIPPED | OUTPATIENT
Start: 2024-01-31 | End: 2024-02-07

## 2024-05-08 ENCOUNTER — OFFICE VISIT (OUTPATIENT)
Dept: OBSTETRICS AND GYNECOLOGY | Facility: CLINIC | Age: 29
End: 2024-05-08
Payer: MEDICAID

## 2024-05-08 VITALS
DIASTOLIC BLOOD PRESSURE: 80 MMHG | WEIGHT: 194.5 LBS | HEIGHT: 66 IN | HEART RATE: 85 BPM | SYSTOLIC BLOOD PRESSURE: 117 MMHG | BODY MASS INDEX: 31.26 KG/M2

## 2024-05-08 DIAGNOSIS — Z80.3 FAMILY HISTORY OF BREAST CANCER: ICD-10-CM

## 2024-05-08 DIAGNOSIS — Z01.419 WELL WOMAN EXAM WITH ROUTINE GYNECOLOGICAL EXAM: Primary | ICD-10-CM

## 2024-05-08 DIAGNOSIS — F41.9 ANXIETY: ICD-10-CM

## 2024-05-08 PROCEDURE — 3008F BODY MASS INDEX DOCD: CPT | Mod: CPTII,S$GLB,, | Performed by: NURSE PRACTITIONER

## 2024-05-08 PROCEDURE — 1160F RVW MEDS BY RX/DR IN RCRD: CPT | Mod: CPTII,S$GLB,, | Performed by: NURSE PRACTITIONER

## 2024-05-08 PROCEDURE — 99395 PREV VISIT EST AGE 18-39: CPT | Mod: S$GLB,,, | Performed by: NURSE PRACTITIONER

## 2024-05-08 PROCEDURE — 3079F DIAST BP 80-89 MM HG: CPT | Mod: CPTII,S$GLB,, | Performed by: NURSE PRACTITIONER

## 2024-05-08 PROCEDURE — 1159F MED LIST DOCD IN RCRD: CPT | Mod: CPTII,S$GLB,, | Performed by: NURSE PRACTITIONER

## 2024-05-08 PROCEDURE — 3074F SYST BP LT 130 MM HG: CPT | Mod: CPTII,S$GLB,, | Performed by: NURSE PRACTITIONER

## 2024-05-08 RX ORDER — DEXTROAMPHETAMINE SACCHARATE, AMPHETAMINE ASPARTATE, DEXTROAMPHETAMINE SULFATE AND AMPHETAMINE SULFATE 5; 5; 5; 5 MG/1; MG/1; MG/1; MG/1
1 TABLET ORAL DAILY
COMMUNITY
Start: 2024-03-11 | End: 2024-05-08

## 2024-05-08 RX ORDER — BUSPIRONE HYDROCHLORIDE 10 MG/1
10 TABLET ORAL 2 TIMES DAILY
Qty: 60 TABLET | Refills: 3 | Status: SHIPPED | OUTPATIENT
Start: 2024-05-08 | End: 2025-05-08

## 2024-05-08 NOTE — PROGRESS NOTES
"    Subjective:       Patient ID: Johanna Iverson is a 28 y.o. female.    Chief Complaint:  Well Woman      History of Present Illness   Presents for annual gyn exam. History and past labs reviewed with patient.    Denies any Complaints   Not using contraception  Desires pregnancy  UPT negative   Lat pap negative     OB History          3    Para   3    Term   2            AB        Living   3         SAB        IAB        Ectopic        Multiple        Live Births   3                  Review of Systems  Review of Systems   Constitutional:  Negative for chills and fever.   Respiratory:  Negative for shortness of breath.    Cardiovascular:  Negative for chest pain.   Gastrointestinal:  Negative for abdominal pain, blood in stool, constipation, diarrhea, nausea, vomiting and reflux.   Genitourinary:  Negative for dysmenorrhea, dyspareunia, dysuria, hematuria, hot flashes, menorrhagia, menstrual problem, pelvic pain, vaginal bleeding, vaginal discharge, postcoital bleeding and vaginal dryness.   Musculoskeletal:  Negative for arthralgias and joint swelling.   Integumentary:  Negative for rash, hair changes, breast mass, nipple discharge and breast skin changes.   Psychiatric/Behavioral:  Negative for depression. The patient is not nervous/anxious.    Breast: Negative for asymmetry, lump, mass, nipple discharge and skin changes          Objective:     Vitals:    24 0949   BP: 117/80   Pulse: 85   Weight: 88.2 kg (194 lb 8 oz)   Height: 5' 6" (1.676 m)        Physical Exam:   Constitutional: She is oriented to person, place, and time. She appears well-developed and well-nourished.    HENT:   Head: Normocephalic and atraumatic.    Eyes: Pupils are equal, round, and reactive to light. Conjunctivae are normal.    Neck: No thyromegaly present.    Cardiovascular:  Normal rate, regular rhythm and normal heart sounds.             Pulmonary/Chest: Effort normal and breath sounds normal.        Abdominal: Soft.  "    Genitourinary:    Inguinal canal, vagina, uterus, right adnexa, left adnexa and rectum normal.      Pelvic exam was performed with patient supine.   The external female genitalia was normal.   Genitalia hair distrobution normal .     Labial bartholins normal.Cervix is normal. No vaginal discharge in the vagina.    pap smear not completedUterus consistancy normal and Uerus contour normal  Uterus is not tender.           Musculoskeletal: Normal range of motion and moves all extremeties.       Neurological: She is alert and oriented to person, place, and time. She has normal reflexes.    Skin: Skin is warm and dry.    Psychiatric: She has a normal mood and affect. Her behavior is normal. Judgment and thought content normal.       breast exam wnl- no nipple dc, skin changes, masses or lymph nodes palpated bilaterally    Assessment:     1. Well woman exam with routine gynecological exam    2. Anxiety    3. Family history of breast cancer              Plan:       Well woman exam with routine gynecological exam    Anxiety  -     busPIRone (BUSPAR) 10 MG tablet; Take 1 tablet (10 mg total) by mouth 2 (two) times daily.  Dispense: 60 tablet; Refill: 3    Family history of breast cancer    PNV  OTC Women's daily Multi vitamin  Healthy diet, exercise and lifestyle discussed  Risk assessment for inherited gyn cancer done-encouraged to call [facility]  Patient was counseled today on A.C.S. Pap guidelines and recommendations for yearly pelvic exams, mammograms and monthly self breast exams; to see her PCP for other health maintenance.       Follow up in about 1 year (around 5/8/2025).

## 2024-07-30 ENCOUNTER — PATIENT MESSAGE (OUTPATIENT)
Dept: OBSTETRICS AND GYNECOLOGY | Facility: CLINIC | Age: 29
End: 2024-07-30
Payer: MEDICAID

## 2024-07-30 DIAGNOSIS — Z02.83 ENCOUNTER FOR DRUG SCREENING: ICD-10-CM

## 2024-07-30 DIAGNOSIS — Z34.81 ENCOUNTER FOR SUPERVISION OF OTHER NORMAL PREGNANCY, FIRST TRIMESTER: Primary | ICD-10-CM

## 2024-08-07 ENCOUNTER — PATIENT MESSAGE (OUTPATIENT)
Dept: OBSTETRICS AND GYNECOLOGY | Facility: CLINIC | Age: 29
End: 2024-08-07
Payer: MEDICAID

## 2024-08-12 ENCOUNTER — OFFICE VISIT (OUTPATIENT)
Dept: OBSTETRICS AND GYNECOLOGY | Facility: CLINIC | Age: 29
End: 2024-08-12
Payer: MEDICAID

## 2024-08-12 VITALS
SYSTOLIC BLOOD PRESSURE: 136 MMHG | HEIGHT: 66 IN | DIASTOLIC BLOOD PRESSURE: 75 MMHG | WEIGHT: 196 LBS | HEART RATE: 76 BPM | BODY MASS INDEX: 31.5 KG/M2

## 2024-08-12 DIAGNOSIS — B96.89 BV (BACTERIAL VAGINOSIS): ICD-10-CM

## 2024-08-12 DIAGNOSIS — Z11.3 SCREENING EXAMINATION FOR SEXUALLY TRANSMITTED DISEASE: ICD-10-CM

## 2024-08-12 DIAGNOSIS — Z34.90 EARLY STAGE OF PREGNANCY: Primary | ICD-10-CM

## 2024-08-12 DIAGNOSIS — N76.0 BV (BACTERIAL VAGINOSIS): ICD-10-CM

## 2024-08-12 RX ORDER — ASCORBIC ACID, CHOLECALCIFEROL, .ALPHA.-TOCOPHEROL ACETATE, DL-, PYRIDOXINE HYDROCHLORIDE, FOLIC ACID, CYANOCOBALAMIN, BIOTIN, CALCIUM CARBONATE, FERROUS ASPARTO GLYCINATE, IRON, POTASSIUM IODIDE, MAGNESIUM OXIDE, DOCONEXENT AND LOWBUSH BLUEBERRY 60; 1000; 10; 26; 400; 13; 280; 80; 9; 9; 150; 25; 350; 25; 600 MG/1; [IU]/1; [IU]/1; MG/1; UG/1; UG/1; UG/1; MG/1; MG/1; MG/1; UG/1; MG/1; MG/1; MG/1; UG/1
1 CAPSULE, GELATIN COATED ORAL DAILY
Qty: 30 CAPSULE | Refills: 10 | Status: SHIPPED | OUTPATIENT
Start: 2024-08-12

## 2024-08-12 NOTE — PROGRESS NOTES
"  CC: Vaginal discharge    Johanna Iverson is a 29 y.o. female  presents with complaint of vaginal discharge for the last week. 6 weeks by LMP with EDC 25.  +vagina d/c some odor.  Spotting early last week.     Past Medical History:   Diagnosis Date    ADHD     Anxiety disorder, unspecified     Asthma     Gestational diabetes mellitus (GDM) affecting third pregnancy     Gestational hypertension 2021     Past Surgical History:   Procedure Laterality Date     SECTION      x 2     Social History     Tobacco Use    Smoking status: Never    Smokeless tobacco: Never   Substance Use Topics    Alcohol use: Never    Drug use: Never     Family History   Problem Relation Name Age of Onset    No Known Problems Paternal Grandfather      Lung cancer Paternal Grandmother      Hypertension Father      No Known Problems Mother      Breast cancer Maternal Aunt  29     OB History    Para Term  AB Living   3 3 2     3   SAB IAB Ectopic Multiple Live Births           3      # Outcome Date GA Lbr Miquel/2nd Weight Sex Type Anes PTL Lv   3 Term 21 37w0d  2.88 kg (6 lb 5.6 oz) M CS-LTranv   JOHANNA      Complications: Gestational HTN   2 Para 19    M CS-LTranv  N JOHANNA   1 Term 10/13/15   2.92 kg (6 lb 7 oz) M CS-LTranv Spinal N JOHANNA      Complications: Breech birth         ROS:  GENERAL: No fever, chills, fatigability or weight loss.  VULVAR: No pain, no lesions and no itching.  VAGINAL: No relaxation, no itching, + discharge, no abnormal bleeding and no lesions.  ABDOMEN: No abdominal pain. Denies nausea. Denies vomiting. No diarrhea. No constipation  BREAST: Denies pain. No lumps. No discharge.  URINARY: No incontinence, no nocturia, no frequency and no dysuria.  CARDIOVASCULAR: No chest pain. No shortness of breath. No leg cramps.  NEUROLOGICAL: No headaches. No vision changes.      /75   Pulse 76   Ht 5' 6" (1.676 m)   Wt 88.9 kg (196 lb)   BMI 31.64 kg/m²     PHYSICAL EXAM:  VULVA: " normal appearing vulva with no masses, tenderness or lesions, VAGINA: vaginal discharge - white, copious, and creamy, CERVIX: normal appearing cervix without discharge or lesions, WET MOUNT done - results: clue cells    ASSESSMENT and PLAN:    ICD-10-CM ICD-9-CM    1. Early stage of pregnancy  Z34.90 V22.2 prenatal vit 87-iron-folic-dha (PRENATE MINI, FERR ASP GLYCIN,) 18-1-350 mg Cap      2. BV (bacterial vaginosis)  N76.0 616.10     B96.89 041.9           Will call if ssx worsen-decided against treatment r/t early pregnancy  ASA 81mg @ 12 weeks r/t PreE delivery @ 37 weeks   Patient was counseled today on vaginitis prevention including :  a. avoiding feminine products such as deoderant soaps, body wash, bubble bath, douches, scented toilet paper, deoderant tampons or pads, feminine wipes, chronic pad use, etc.  b. avoiding other vulvovaginal irritants such as long hot baths, humidity, tight, synthetic clothing, chlorine and sitting around in wet bathing suits  c. wearing cotton underwear, avoiding thong underwear and no underwear to bed  d. taking showers instead of baths and use a hair dryer on cool setting afterwards to dry  e. wearing cotton to exercise and shower immediately after exercise and change clothes  f. using polyurethane condoms without spermicide if sexually active and symptoms are triggered by intercourse    FOLLOW UP: PRN lack of improvement.

## 2024-08-27 ENCOUNTER — PROCEDURE VISIT (OUTPATIENT)
Dept: OBSTETRICS AND GYNECOLOGY | Facility: CLINIC | Age: 29
End: 2024-08-27
Payer: MEDICAID

## 2024-08-27 DIAGNOSIS — Z34.81 ENCOUNTER FOR SUPERVISION OF OTHER NORMAL PREGNANCY, FIRST TRIMESTER: ICD-10-CM

## 2024-09-22 ENCOUNTER — PATIENT MESSAGE (OUTPATIENT)
Dept: OBSTETRICS AND GYNECOLOGY | Facility: CLINIC | Age: 29
End: 2024-09-22
Payer: MEDICAID

## 2024-10-01 ENCOUNTER — INITIAL PRENATAL (OUTPATIENT)
Dept: OBSTETRICS AND GYNECOLOGY | Facility: CLINIC | Age: 29
End: 2024-10-01
Payer: MEDICAID

## 2024-10-01 VITALS
HEART RATE: 86 BPM | WEIGHT: 206.19 LBS | SYSTOLIC BLOOD PRESSURE: 140 MMHG | BODY MASS INDEX: 33.28 KG/M2 | DIASTOLIC BLOOD PRESSURE: 89 MMHG

## 2024-10-01 DIAGNOSIS — Z34.81 ENCOUNTER FOR SUPERVISION OF OTHER NORMAL PREGNANCY IN FIRST TRIMESTER: Primary | ICD-10-CM

## 2024-10-01 DIAGNOSIS — Z3A.12 12 WEEKS GESTATION OF PREGNANCY: ICD-10-CM

## 2024-10-01 PROBLEM — Z34.91 ENCOUNTER FOR SUPERVISION OF NORMAL PREGNANCY IN FIRST TRIMESTER: Status: ACTIVE | Noted: 2024-10-01

## 2024-10-01 LAB
ABS NRBC COUNT: 0 X 10 3/UL (ref 0–0.01)
ABSOLUTE BASOPHIL: 0.05 X 10 3/UL (ref 0–0.22)
ABSOLUTE EOSINOPHIL: 0.17 X 10 3/UL (ref 0.04–0.54)
ABSOLUTE IMMATURE GRAN: 0.04 X 10 3/UL (ref 0–0.04)
ABSOLUTE LYMPHOCYTE: 1.83 X 10 3/UL (ref 0.86–4.75)
ABSOLUTE MONOCYTE: 0.55 X 10 3/UL (ref 0.22–1.08)
ADDITIONAL TESTING: NORMAL
ALBUMIN SERPL-MCNC: 4.2 G/DL (ref 3.5–5.2)
ALBUMIN/GLOB SERPL ELPH: 1.4 {RATIO} (ref 1–2.7)
ALP ISOS SERPL LEV INH-CCNC: 68 U/L (ref 35–105)
ALT (SGPT): 12 U/L (ref 0–33)
ANION GAP SERPL CALC-SCNC: 13 MMOL/L (ref 8–17)
AST SERPL-CCNC: 11 U/L (ref 0–32)
BASOPHILS NFR BLD: 0.6 % (ref 0.2–1.2)
BILIRUBIN, TOTAL: 0.35 MG/DL (ref 0–1.2)
BUN/CREAT SERPL: 11.5 (ref 6–20)
CALCIUM SERPL-MCNC: 9.1 MG/DL (ref 8.6–10.2)
CARBON DIOXIDE, CO2: 21 MMOL/L (ref 22–29)
CHLORIDE: 107 MMOL/L (ref 98–107)
CREAT SERPL-MCNC: 0.62 MG/DL (ref 0.5–0.9)
EOSINOPHIL NFR BLD: 2.1 % (ref 0.7–7)
GFR ESTIMATION: 122.78 ML/MIN/1.73M2
GLOBULIN: 3 G/DL (ref 1.5–4.5)
GLUCOSE: 83 MG/DL (ref 74–106)
HCT VFR BLD AUTO: 37.5 % (ref 37–47)
HGB BLD-MCNC: 13.3 G/DL (ref 12–16)
IMMATURE GRANULOCYTES: 0.5 % (ref 0–0.5)
KIT PROVIDED BY:: NORMAL
KIT SHIPPED TO LAB BY:: NORMAL
LDH SERPL L TO P-CCNC: 148 U/L (ref 135–214)
LYMPHOCYTES NFR BLD: 22.3 % (ref 19.3–53.1)
MCH RBC QN AUTO: 30.8 PG (ref 27–32)
MCHC RBC AUTO-ENTMCNC: 35.5 G/DL (ref 32–36)
MCV RBC AUTO: 86.8 FL (ref 82–100)
MONOCYTES NFR BLD: 6.7 % (ref 4.7–12.5)
NEUTROPHILS # BLD AUTO: 5.56 X 10 3/UL (ref 2.15–7.56)
NEUTROPHILS NFR BLD: 67.8 % (ref 34–71.1)
NUCLEATED RED BLOOD CELLS: 0 /100 WBC (ref 0–0.2)
PLATELET # BLD AUTO: 219 X 10 3/UL (ref 135–400)
POTASSIUM: 3.9 MMOL/L (ref 3.5–5.1)
PROT SNV-MCNC: 7.2 G/DL (ref 6.4–8.3)
RBC # BLD AUTO: 4.32 X 10 6/UL (ref 4.2–5.4)
RDW-SD: 40.6 FL (ref 37–54)
SODIUM: 141 MMOL/L (ref 136–145)
URATE SERPL-MCNC: 3 MG/DL (ref 2.4–5.7)
UREA NITROGEN (BUN): 7.1 MG/DL (ref 6–20)
WBC # BLD: 8.2 X 10 3/UL (ref 4.3–10.8)

## 2024-10-01 RX ORDER — LABETALOL 200 MG/1
200 TABLET, FILM COATED ORAL 2 TIMES DAILY
Qty: 60 TABLET | Refills: 11 | Status: SHIPPED | OUTPATIENT
Start: 2024-10-01 | End: 2025-10-01

## 2024-10-01 NOTE — PROGRESS NOTES
CC: Initial OB visit    HPI:  29 y.o. at 12w3d with EDC of 2025, by 7w Ultrasound.  Complains of LE swelling and elevated BP at home, she is not on meds at this time.    ROS:  Negative unless mentioned above    PMH: ADHD, anxiety, asthma  PSH: C/sx 3  ALL: NKDA   OB Hx: -3: C/s x 3, h/o GHTN   SOC: denies S/E/D   FH: denies family history of congenital defects, mental retardation, twins, cystic fibrosis, Down's syndrome, sickle cell, NTD's, cleft lip/palate, cardiac defects, abdominal wall defects, GYN cancers   GYN Hx: no past history STD's,  Negative History of HSV,  Positive History of Abnormal PAP    PHYSICAL EXAM  Prenatal Vitals  BP: (!) 140/89  Weight: 93.5 kg (206 lb 3.2 oz)  Urine Glucose: Negative  Urine Albumin: Negative    Body mass index is 33.28 kg/m².    Wt Readings from Last 3 Encounters:   10/01/24 93.5 kg (206 lb 3.2 oz)   24 88.9 kg (196 lb)   24 88.2 kg (194 lb 8 oz)     General: NAD, well developed, well nourished  Psych: alert and oriented to person, time and place, normal affect  HEENT: normocephalic, atraumatic  Gravid, soft, NT  Skin: warm, dry  Neuro: normal gait, gross motor function intact  Pelvic: NEFG. Normal vaginal mucosa, pink/ruggated, no lesions or discharge. Cvx closed, nonfriable  No cyanosis, clubbing or edema    PNL reviewed: wnl     ASSESSMENT: Patient is a 29 y.o.  at 12w3d with  Patient Active Problem List   Diagnosis    History of     Abnormal uterine bleeding (AUB)    Encounter for supervision of normal pregnancy in first trimester     PLAN:  NIPT today, AFP on RTC  Pt to start low dose ASA daily and will start Labetalol 200 mg BID today  Baseline PreE labs today  PNL - reviewed, GC/CZ, PAP - utd  PNV, Iron  Pain, Fever, Bleeding Precautions  SHIV, VIVIEN, PreE precautions  Encouraged Breast feeding  F/U in 4 weeks

## 2024-10-07 ENCOUNTER — PATIENT MESSAGE (OUTPATIENT)
Dept: OBSTETRICS AND GYNECOLOGY | Facility: CLINIC | Age: 29
End: 2024-10-07
Payer: MEDICAID

## 2024-10-08 ENCOUNTER — ROUTINE PRENATAL (OUTPATIENT)
Dept: OBSTETRICS AND GYNECOLOGY | Facility: CLINIC | Age: 29
End: 2024-10-08
Payer: MEDICAID

## 2024-10-08 VITALS
BODY MASS INDEX: 33.25 KG/M2 | DIASTOLIC BLOOD PRESSURE: 75 MMHG | SYSTOLIC BLOOD PRESSURE: 128 MMHG | HEART RATE: 80 BPM | WEIGHT: 206 LBS

## 2024-10-08 DIAGNOSIS — O09.92 SUPERVISION OF HIGH RISK PREGNANCY IN SECOND TRIMESTER: Primary | ICD-10-CM

## 2024-10-08 DIAGNOSIS — Z3A.13 13 WEEKS GESTATION OF PREGNANCY: ICD-10-CM

## 2024-10-08 PROCEDURE — 99213 OFFICE O/P EST LOW 20 MIN: CPT | Mod: S$PBB,TH,, | Performed by: STUDENT IN AN ORGANIZED HEALTH CARE EDUCATION/TRAINING PROGRAM

## 2024-10-08 NOTE — PROGRESS NOTES
CC: Follow-Up OB    HPI:   29 y.o.  at 13w3d with EDC of 2025, by Ultrasound, here for her follow up OB visit. Complains of occasional HA.    Pregnancy ROS:  Negative leakage of fluid   Negative vaginal bleeding   Negative headache, vision changes, RUQ pain, epigastric pain  Negative contractions/abdominal pain   Negative pelvic pressure     Physical Exam:  Prenatal Vitals  BP: 128/75  Weight: 93.4 kg (206 lb)  Urine Glucose: Negative  Urine Albumin: Negative    Wt Readings from Last 3 Encounters:   10/08/24 93.4 kg (206 lb)   10/01/24 93.5 kg (206 lb 3.2 oz)   24 88.9 kg (196 lb)       Body mass index is 33.25 kg/m².    General: NAD, well developed, well nourished  Psych: alert and oriented to person, time and place, normal affect  HEENT: normocephalic, atraumatic  Abd: Gravid, soft, NT, ND  Skin: warm, dry  Neuro: normal gait, gross motor function intact  No cyanosis, clubbing or edema    Maternal Blood Type: O POS    ASSESSMENT: 29 y.o.  @ 13w3d with   Patient Active Problem List   Diagnosis    History of     Abnormal uterine bleeding (AUB)    Encounter for supervision of normal pregnancy in first trimester       PLAN:  Continue low dose ASA daily and Labetalol 200 mg BID   BP better controlled with medication  Tylenol PRN for HA  NIPT negative  AFP on RTC  Pain, fever, bleeding precautions  SHIV, VIVIEN, PreE precautions  Cont PNV, Iron  Return to clinic in 4 weeks

## 2024-10-26 ENCOUNTER — PATIENT MESSAGE (OUTPATIENT)
Dept: OTHER | Facility: OTHER | Age: 29
End: 2024-10-26
Payer: MEDICAID

## 2024-10-28 ENCOUNTER — PATIENT MESSAGE (OUTPATIENT)
Dept: OBSTETRICS AND GYNECOLOGY | Facility: CLINIC | Age: 29
End: 2024-10-28
Payer: MEDICAID

## 2024-11-02 ENCOUNTER — PATIENT MESSAGE (OUTPATIENT)
Dept: OTHER | Facility: OTHER | Age: 29
End: 2024-11-02
Payer: MEDICAID

## 2024-11-04 ENCOUNTER — PATIENT MESSAGE (OUTPATIENT)
Dept: OBSTETRICS AND GYNECOLOGY | Facility: CLINIC | Age: 29
End: 2024-11-04
Payer: MEDICAID

## 2024-11-05 ENCOUNTER — ROUTINE PRENATAL (OUTPATIENT)
Dept: OBSTETRICS AND GYNECOLOGY | Facility: CLINIC | Age: 29
End: 2024-11-05
Payer: MEDICAID

## 2024-11-05 VITALS
DIASTOLIC BLOOD PRESSURE: 72 MMHG | BODY MASS INDEX: 33.89 KG/M2 | WEIGHT: 210 LBS | HEART RATE: 84 BPM | SYSTOLIC BLOOD PRESSURE: 115 MMHG

## 2024-11-05 DIAGNOSIS — Z98.891 HISTORY OF C-SECTION: ICD-10-CM

## 2024-11-05 DIAGNOSIS — O16.9 HYPERTENSION AFFECTING PREGNANCY, ANTEPARTUM: ICD-10-CM

## 2024-11-05 DIAGNOSIS — Z36.89 ENCOUNTER FOR FETAL ANATOMIC SURVEY: ICD-10-CM

## 2024-11-05 DIAGNOSIS — Z34.82 ENCOUNTER FOR SUPERVISION OF NORMAL PREGNANCY IN MULTIGRAVIDA IN SECOND TRIMESTER: Primary | ICD-10-CM

## 2024-11-05 PROCEDURE — 99213 OFFICE O/P EST LOW 20 MIN: CPT | Mod: TH,S$PBB,, | Performed by: NURSE PRACTITIONER

## 2024-11-05 NOTE — PROGRESS NOTES
Subjective:       Patient ID: Johanna Iverson is a 29 y.o.  at 17w3d      Chief Complaint:  Routine Prenatal Visit      History of Present Illness  No complaints.. Labs and history reviewed with pt.       Review of Systems  Denies n/v, f/c, dysuria, contractions,   VD, VB, round ligament pain, headaches      OB History          4    Para   3    Term   2            AB        Living   3         SAB        IAB        Ectopic        Multiple        Live Births   3                   Objective:     Vitals:    24 0833   BP: 115/72   Pulse: 84     Wt Readings from Last 3 Encounters:   24 95.3 kg (210 lb)   10/08/24 93.4 kg (206 lb)   10/01/24 93.5 kg (206 lb 3.2 oz)        nad  NCAT  pupils normal size  Skin nml no rashes or lesions  No resp distress, resp even and unlabored  Gravid nt, no rebound no guarding  No cyanosis or clubbing, edema appropriate for pregn  FH AGA  FHT: 140s       Assessment:        1. Encounter for supervision of normal pregnancy in multigravida in second trimester    2. Encounter for fetal anatomic survey    3. History of     4. Hypertension affecting pregnancy, antepartum                Plan:        Encounter for supervision of normal pregnancy in multigravida in second trimester    Encounter for fetal anatomic survey  -     US OB/GYN Procedure ([facility]) - Extended List; Future    History of     Hypertension affecting pregnancy, antepartum    Declines AFP  Anatomy u/s @ 22 weeks   Continue ASA/labetalol   Encouraged PNV  Pain, fever, bleeding precautions   Follow up in about 4 weeks (around 12/3/2024).

## 2024-11-20 ENCOUNTER — PATIENT MESSAGE (OUTPATIENT)
Dept: OBSTETRICS AND GYNECOLOGY | Facility: CLINIC | Age: 29
End: 2024-11-20
Payer: MEDICAID

## 2024-11-23 ENCOUNTER — PATIENT MESSAGE (OUTPATIENT)
Dept: OTHER | Facility: OTHER | Age: 29
End: 2024-11-23
Payer: MEDICAID

## 2024-12-03 ENCOUNTER — ROUTINE PRENATAL (OUTPATIENT)
Dept: OBSTETRICS AND GYNECOLOGY | Facility: CLINIC | Age: 29
End: 2024-12-03
Payer: MEDICAID

## 2024-12-03 ENCOUNTER — PROCEDURE VISIT (OUTPATIENT)
Dept: OBSTETRICS AND GYNECOLOGY | Facility: CLINIC | Age: 29
End: 2024-12-03
Payer: MEDICAID

## 2024-12-03 VITALS
SYSTOLIC BLOOD PRESSURE: 128 MMHG | HEART RATE: 85 BPM | DIASTOLIC BLOOD PRESSURE: 75 MMHG | WEIGHT: 210.5 LBS | BODY MASS INDEX: 33.98 KG/M2

## 2024-12-03 DIAGNOSIS — Z34.82 ENCOUNTER FOR SUPERVISION OF NORMAL PREGNANCY IN MULTIGRAVIDA IN SECOND TRIMESTER: Primary | ICD-10-CM

## 2024-12-03 DIAGNOSIS — Z98.891 HISTORY OF C-SECTION: ICD-10-CM

## 2024-12-03 DIAGNOSIS — O16.9 HYPERTENSION AFFECTING PREGNANCY, ANTEPARTUM: ICD-10-CM

## 2024-12-03 DIAGNOSIS — Z36.89 ENCOUNTER FOR FETAL ANATOMIC SURVEY: ICD-10-CM

## 2024-12-03 PROCEDURE — 76805 OB US >/= 14 WKS SNGL FETUS: CPT | Mod: 26,S$PBB,, | Performed by: STUDENT IN AN ORGANIZED HEALTH CARE EDUCATION/TRAINING PROGRAM

## 2024-12-03 NOTE — PROGRESS NOTES
Subjective:       Patient ID: Johanna Iverson is a 29 y.o.  at 21w3d      Chief Complaint:  Routine Prenatal Visit      History of Present Illness  No complaints. Reports normal fetal movement. Labs and history reviewed with pt.       Review of Systems  Denies n/v, f/c, dysuria, contractions,   VD, VB, round ligament pain, headaches      OB History          4    Para   3    Term   2            AB        Living   3         SAB        IAB        Ectopic        Multiple        Live Births   3                   Objective:     Vitals:    24 0904   BP: 128/75   Pulse: 85     Wt Readings from Last 3 Encounters:   24 95.5 kg (210 lb 8 oz)   24 95.3 kg (210 lb)   10/08/24 93.4 kg (206 lb)        nad  NCAT  pupils normal size  Skin nml no rashes or lesions  No resp distress, resp even and unlabored  Gravid nt, no rebound no guarding  No cyanosis or clubbing, edema appropriate for pregn  FH AGA  FHT: 145 by ultrasound    Ultrasound    Cephalic  Placental posterior  EFW 60 oz 44%   Anatomy confirmed        Assessment:        1. Encounter for supervision of normal pregnancy in multigravida in second trimester    2. Hypertension affecting pregnancy, antepartum    3. History of                 Plan:        Encounter for supervision of normal pregnancy in multigravida in second trimester    Hypertension affecting pregnancy, antepartum    History of     Growth obus @ 28 weeks   Continue labetalol and ASA  Encouraged PNV  Pain, fever, bleeding precautions   Follow up in about 4 weeks (around 2024).

## 2024-12-12 ENCOUNTER — PATIENT MESSAGE (OUTPATIENT)
Dept: OBSTETRICS AND GYNECOLOGY | Facility: CLINIC | Age: 29
End: 2024-12-12
Payer: MEDICAID

## 2024-12-21 ENCOUNTER — PATIENT MESSAGE (OUTPATIENT)
Dept: OTHER | Facility: OTHER | Age: 29
End: 2024-12-21
Payer: MEDICAID

## 2024-12-26 ENCOUNTER — PATIENT MESSAGE (OUTPATIENT)
Dept: OBSTETRICS AND GYNECOLOGY | Facility: CLINIC | Age: 29
End: 2024-12-26
Payer: MEDICAID

## 2025-01-02 DIAGNOSIS — O99.340 ANXIETY DURING PREGNANCY: Primary | ICD-10-CM

## 2025-01-02 DIAGNOSIS — F41.9 ANXIETY DURING PREGNANCY: Primary | ICD-10-CM

## 2025-01-03 RX ORDER — SERTRALINE HYDROCHLORIDE 25 MG/1
25 TABLET, FILM COATED ORAL DAILY
Qty: 30 TABLET | Refills: 1 | Status: SHIPPED | OUTPATIENT
Start: 2025-01-03 | End: 2025-03-04

## 2025-01-04 ENCOUNTER — PATIENT MESSAGE (OUTPATIENT)
Dept: OTHER | Facility: OTHER | Age: 30
End: 2025-01-04
Payer: MEDICAID

## 2025-01-07 ENCOUNTER — ROUTINE PRENATAL (OUTPATIENT)
Dept: OBSTETRICS AND GYNECOLOGY | Facility: CLINIC | Age: 30
End: 2025-01-07
Payer: MEDICAID

## 2025-01-07 VITALS
SYSTOLIC BLOOD PRESSURE: 121 MMHG | BODY MASS INDEX: 34.42 KG/M2 | DIASTOLIC BLOOD PRESSURE: 72 MMHG | WEIGHT: 213.25 LBS | HEART RATE: 90 BPM

## 2025-01-07 DIAGNOSIS — Z34.82 ENCOUNTER FOR SUPERVISION OF NORMAL PREGNANCY IN MULTIGRAVIDA IN SECOND TRIMESTER: Primary | ICD-10-CM

## 2025-01-07 DIAGNOSIS — O16.9 HYPERTENSION AFFECTING PREGNANCY, ANTEPARTUM: ICD-10-CM

## 2025-01-07 DIAGNOSIS — O99.340 DEPRESSION AFFECTING PREGNANCY: ICD-10-CM

## 2025-01-07 DIAGNOSIS — Z98.891 HISTORY OF C-SECTION: ICD-10-CM

## 2025-01-07 DIAGNOSIS — F32.A DEPRESSION AFFECTING PREGNANCY: ICD-10-CM

## 2025-01-07 LAB — GLUCOSE 1 HR POST 50 GM: 91 MG/DL

## 2025-01-07 NOTE — PROGRESS NOTES
Subjective:       Patient ID: Johanna Iverson is a 29 y.o.  at 26w3d      Chief Complaint:  Routine Prenatal Visit      History of Present Illness  No complaints. Reports normal fetal movement. Labs and history reviewed with pt.       Review of Systems  Denies n/v, f/c, dysuria, contractions,   VD, VB, round ligament pain, headaches      OB History          4    Para   3    Term   2            AB        Living   3         SAB        IAB        Ectopic        Multiple        Live Births   3                   Objective:     Vitals:    25 0945   BP: 121/72   Pulse: 90     Wt Readings from Last 3 Encounters:   25 96.7 kg (213 lb 4 oz)   24 95.5 kg (210 lb 8 oz)   24 95.3 kg (210 lb)        nad  NCAT  pupils normal size  Skin nml no rashes or lesions  No resp distress, resp even and unlabored  Gravid nt, no rebound no guarding  No cyanosis or clubbing, edema appropriate for pregn  FH AGA  FHT: 150s       Assessment:        1. Encounter for supervision of normal pregnancy in multigravida in second trimester    2. Hypertension affecting pregnancy, antepartum    3. History of     4. Depression affecting pregnancy                Plan:        Encounter for supervision of normal pregnancy in multigravida in second trimester  -     Glucose, 1 Hr Post 50 GM; Future; Expected date: 2025    Hypertension affecting pregnancy, antepartum    History of     Depression affecting pregnancy     BPP and Growth u/s @ 32 week   Continue labetalol/asa/zoloft  Feeling better with zoloft  Encouraged PNV  Pain, fever, bleeding precautions   Follow up in about 3 weeks (around 2025).

## 2025-01-08 ENCOUNTER — PATIENT MESSAGE (OUTPATIENT)
Dept: OBSTETRICS AND GYNECOLOGY | Facility: CLINIC | Age: 30
End: 2025-01-08
Payer: MEDICAID

## 2025-01-14 ENCOUNTER — PATIENT MESSAGE (OUTPATIENT)
Dept: OBSTETRICS AND GYNECOLOGY | Facility: CLINIC | Age: 30
End: 2025-01-14
Payer: MEDICAID

## 2025-01-18 ENCOUNTER — PATIENT MESSAGE (OUTPATIENT)
Dept: OTHER | Facility: OTHER | Age: 30
End: 2025-01-18
Payer: MEDICAID

## 2025-01-28 ENCOUNTER — ROUTINE PRENATAL (OUTPATIENT)
Dept: OBSTETRICS AND GYNECOLOGY | Facility: CLINIC | Age: 30
End: 2025-01-28
Payer: MEDICAID

## 2025-01-28 VITALS
WEIGHT: 214.25 LBS | SYSTOLIC BLOOD PRESSURE: 125 MMHG | DIASTOLIC BLOOD PRESSURE: 73 MMHG | HEART RATE: 93 BPM | BODY MASS INDEX: 34.58 KG/M2

## 2025-01-28 DIAGNOSIS — O16.9 HYPERTENSION AFFECTING PREGNANCY, ANTEPARTUM: ICD-10-CM

## 2025-01-28 DIAGNOSIS — Z34.83 ENCOUNTER FOR SUPERVISION OF NORMAL PREGNANCY IN MULTIGRAVIDA IN THIRD TRIMESTER: Primary | ICD-10-CM

## 2025-01-28 PROCEDURE — 99213 OFFICE O/P EST LOW 20 MIN: CPT | Mod: TH,S$PBB,, | Performed by: NURSE PRACTITIONER

## 2025-01-28 NOTE — PROGRESS NOTES
CC: Follow-Up OB    HPI:   29 y.o.  at 29w3d with EDC of 2025, by Ultrasound, here for her follow up OB visit.  Denies any complaints.    Pregnancy ROS:  Positive fetal movement   Negative leakage of fluid   Negative vaginal bleeding   Negative headache, vision changes, RUQ pain, epigastric pain  Negative contractions/abdominal pain   Negative pelvic pressure     Physical Exam:  Prenatal Vitals  BP: 125/73  Weight: 97.2 kg (214 lb 4 oz)  Fetal Heart Rate: 140's    Wt Readings from Last 3 Encounters:   25 97.2 kg (214 lb 4 oz)   25 96.7 kg (213 lb 4 oz)   24 95.5 kg (210 lb 8 oz)       Body mass index is 34.58 kg/m².    General: NAD, well developed, well nourished  Psych: alert and oriented to person, time and place, normal affect  HEENT: normocephalic, atraumatic  Abd: Gravid, soft, NT, ND  Skin: warm, dry  Neuro: normal gait, gross motor function intact  No cyanosis, clubbing or edema    FHTs: 140s  FH: AGA    Maternal Blood Type: O POS    ASSESSMENT: 29 y.o.  @ 29w3d with   1. Encounter for supervision of normal pregnancy in multigravida in third trimester    2. Hypertension affecting pregnancy, antepartum         PLAN:  Encounter for supervision of normal pregnancy in multigravida in third trimester  -     CBC Auto Differential; Future; Expected date: 2025    Hypertension affecting pregnancy, antepartum  -     US OB/GYN Procedure ([facility]) - Extended List; Future    Discussed Tdap   Discussed breastfeeding and prenatal education classes available.  Growth u/s with BPP @ 32 weeks   Weekly BPPS    Pain, fever, bleeding precautions  Labor, Fetal movement, and Preeclampsia precautions  Cont PNV/ASA/labetalol   Return to clinic in 2 weeks

## 2025-02-12 ENCOUNTER — ROUTINE PRENATAL (OUTPATIENT)
Dept: OBSTETRICS AND GYNECOLOGY | Facility: CLINIC | Age: 30
End: 2025-02-12
Payer: MEDICAID

## 2025-02-12 ENCOUNTER — PROCEDURE VISIT (OUTPATIENT)
Dept: OBSTETRICS AND GYNECOLOGY | Facility: CLINIC | Age: 30
End: 2025-02-12
Payer: MEDICAID

## 2025-02-12 VITALS
DIASTOLIC BLOOD PRESSURE: 74 MMHG | HEART RATE: 68 BPM | SYSTOLIC BLOOD PRESSURE: 130 MMHG | BODY MASS INDEX: 34.7 KG/M2 | WEIGHT: 215 LBS

## 2025-02-12 DIAGNOSIS — Z34.83 ENCOUNTER FOR SUPERVISION OF NORMAL PREGNANCY IN MULTIGRAVIDA IN THIRD TRIMESTER: Primary | ICD-10-CM

## 2025-02-12 DIAGNOSIS — J45.909 ASTHMA DURING PREGNANCY: ICD-10-CM

## 2025-02-12 DIAGNOSIS — J01.10 SUBACUTE FRONTAL SINUSITIS: ICD-10-CM

## 2025-02-12 DIAGNOSIS — Z98.891 HISTORY OF C-SECTION: ICD-10-CM

## 2025-02-12 DIAGNOSIS — O16.9 HYPERTENSION AFFECTING PREGNANCY, ANTEPARTUM: ICD-10-CM

## 2025-02-12 DIAGNOSIS — O99.519 ASTHMA DURING PREGNANCY: ICD-10-CM

## 2025-02-12 RX ORDER — CETIRIZINE HYDROCHLORIDE 10 MG/1
10 TABLET ORAL DAILY
Qty: 30 TABLET | Refills: 6 | Status: SHIPPED | OUTPATIENT
Start: 2025-02-12 | End: 2026-02-12

## 2025-02-12 RX ORDER — AZITHROMYCIN 500 MG/1
500 TABLET, FILM COATED ORAL DAILY
Qty: 5 TABLET | Refills: 0 | Status: SHIPPED | OUTPATIENT
Start: 2025-02-12 | End: 2025-02-17

## 2025-02-12 RX ORDER — FLUTICASONE PROPIONATE 50 MCG
1 SPRAY, SUSPENSION (ML) NASAL DAILY
Qty: 11.1 ML | Refills: 3 | Status: SHIPPED | OUTPATIENT
Start: 2025-02-12

## 2025-02-12 NOTE — PROGRESS NOTES
CC: Follow-Up OB    HPI:   29 y.o.  at 31w4d with EDC of 2025, by Ultrasound, here for her follow up OB visit.  Complains of cough, sore throat, and wheezing for the last 3 weeks.    Pregnancy ROS:  Positive fetal movement   Negative leakage of fluid   Negative vaginal bleeding   Negative headache, vision changes, RUQ pain, epigastric pain  Negative contractions/abdominal pain   Negative pelvic pressure     Physical Exam:  Prenatal Vitals  BP: 130/74  Weight: 97.5 kg (215 lb)  Fetal Heart Rate: 150's  Urine Glucose: Negative  Urine Albumin: Negative    Wt Readings from Last 3 Encounters:   25 97.5 kg (215 lb)   25 97.2 kg (214 lb 4 oz)   25 96.7 kg (213 lb 4 oz)       Body mass index is 34.7 kg/m².    General: NAD, well developed, well nourished  Psych: alert and oriented to person, time and place, normal affect  HEENT: normocephalic, atraumatic  Abd: Gravid, soft, NT, ND  Skin: warm, dry  Neuro: normal gait, gross motor function intact  No cyanosis, clubbing or edema    FHTs: 150s  FH: AGA    Maternal Blood Type: O POS    Ultrasound  EFW 3# 15 oz 28%    JANUARY 14 cm    BPP      ASSESSMENT: 29 y.o.  @ 31w4d with   1. Encounter for supervision of normal pregnancy in multigravida in third trimester    2. Subacute frontal sinusitis    3. Asthma during pregnancy    4. History of     5. Hypertension affecting pregnancy, antepartum         PLAN:  Encounter for supervision of normal pregnancy in multigravida in third trimester    Subacute frontal sinusitis  -     azithromycin (ZITHROMAX) 500 MG tablet; Take 1 tablet (500 mg total) by mouth once daily. for 5 days  Dispense: 5 tablet; Refill: 0  -     fluticasone propionate (FLONASE) 50 mcg/actuation nasal spray; 1 spray (50 mcg total) by Each Nostril route once daily.  Dispense: 11.1 mL; Refill: 3  -     cetirizine (ZYRTEC) 10 MG tablet; Take 1 tablet (10 mg total) by mouth once daily.  Dispense: 30 tablet; Refill:  6    Asthma during pregnancy    History of     Hypertension affecting pregnancy, antepartum  -     US OB/GYN Procedure ([facility]); Future    Weekly BPPS  Pain, fever, bleeding precautions  Labor, Fetal movement, and Preeclampsia precautions  Cont PNV/asa/labetalol  Return to clinic in 1 week

## 2025-02-15 ENCOUNTER — PATIENT MESSAGE (OUTPATIENT)
Dept: OTHER | Facility: OTHER | Age: 30
End: 2025-02-15
Payer: MEDICAID

## 2025-02-18 LAB
APPEARANCE, UA: CLEAR
BILIRUB UR QL STRIP: NEGATIVE MG/DL
COLOR UR: NORMAL
GLUCOSE (UA): NORMAL MG/DL
HGB UR QL STRIP: NEGATIVE /UL
KETONES UR QL STRIP: NEGATIVE MG/DL
LEUKOCYTE ESTERASE UR QL STRIP: NEGATIVE /UL
NITRITE UR QL STRIP: NEGATIVE
PH UR STRIP: 7 PH (ref 5–8)
PROT UR QL STRIP: NEGATIVE MG/DL
SP GR UR STRIP: 1.01 (ref 1–1.03)
SPECIMEN COLLECTION METHOD, URINE: NORMAL
UROBILINOGEN UR STRIP-ACNC: NORMAL MG/DL

## 2025-02-19 ENCOUNTER — ROUTINE PRENATAL (OUTPATIENT)
Dept: OBSTETRICS AND GYNECOLOGY | Facility: CLINIC | Age: 30
End: 2025-02-19
Payer: MEDICAID

## 2025-02-19 ENCOUNTER — PROCEDURE VISIT (OUTPATIENT)
Dept: OBSTETRICS AND GYNECOLOGY | Facility: CLINIC | Age: 30
End: 2025-02-19
Payer: MEDICAID

## 2025-02-19 VITALS
SYSTOLIC BLOOD PRESSURE: 132 MMHG | HEART RATE: 76 BPM | WEIGHT: 215.81 LBS | BODY MASS INDEX: 34.83 KG/M2 | DIASTOLIC BLOOD PRESSURE: 76 MMHG

## 2025-02-19 DIAGNOSIS — O16.9 HYPERTENSION AFFECTING PREGNANCY, ANTEPARTUM: ICD-10-CM

## 2025-02-19 DIAGNOSIS — Z34.83 ENCOUNTER FOR SUPERVISION OF NORMAL PREGNANCY IN MULTIGRAVIDA IN THIRD TRIMESTER: Primary | ICD-10-CM

## 2025-02-19 DIAGNOSIS — Z98.891 HISTORY OF C-SECTION: ICD-10-CM

## 2025-02-19 NOTE — PROGRESS NOTES
CC: Follow-Up OB    HPI:   29 y.o.  at 32w4d with EDC of 2025, by Ultrasound, here for her follow up OB visit.  Denies any complaints. Seen @ L&D last yesterday for elevated pressure and headache. Feels much better today     Pregnancy ROS:  Positive fetal movement   Negative leakage of fluid   Negative vaginal bleeding   Negative headache, vision changes, RUQ pain, epigastric pain  Negative contractions/abdominal pain   Negative pelvic pressure     Physical Exam:  Prenatal Vitals  BP: 132/76  Weight: 97.9 kg (215 lb 12.8 oz)  Fetal Heart Rate: 131  Urine Glucose: Negative  Urine Albumin: Negative    Wt Readings from Last 3 Encounters:   25 97.9 kg (215 lb 12.8 oz)   25 97.5 kg (215 lb)   25 97.2 kg (214 lb 4 oz)       Body mass index is 34.83 kg/m².    General: NAD, well developed, well nourished  Psych: alert and oriented to person, time and place, normal affect  HEENT: normocephalic, atraumatic  Abd: Gravid, soft, NT, ND  Skin: warm, dry  Neuro: normal gait, gross motor function intact  No cyanosis, clubbing or edema    FHTs: 131 by u/s   FH: AGA    Maternal Blood Type: O POS    BPP  JANUARY 16.8 cm        ASSESSMENT: 29 y.o.  @ 32w4d with   1. Encounter for supervision of normal pregnancy in multigravida in third trimester    2. History of     3. Hypertension affecting pregnancy, antepartum         PLAN:  Encounter for supervision of normal pregnancy in multigravida in third trimester  -     Tdap (BOOSTRIX) vaccine injection 0.5 mL    History of     Hypertension affecting pregnancy, antepartum    Weekly BPPS   Pain, fever, bleeding precautions  Labor, Fetal movement, and Preeclampsia precautions  Cont PNV/asa/labetalol   Return to clinic in 1 week

## 2025-02-20 DIAGNOSIS — O16.9 HYPERTENSION AFFECTING PREGNANCY, ANTEPARTUM: ICD-10-CM

## 2025-02-20 DIAGNOSIS — Z34.83 ENCOUNTER FOR SUPERVISION OF NORMAL PREGNANCY IN MULTIGRAVIDA IN THIRD TRIMESTER: Primary | ICD-10-CM

## 2025-02-26 ENCOUNTER — ROUTINE PRENATAL (OUTPATIENT)
Dept: OBSTETRICS AND GYNECOLOGY | Facility: CLINIC | Age: 30
End: 2025-02-26
Payer: MEDICAID

## 2025-02-26 ENCOUNTER — PROCEDURE VISIT (OUTPATIENT)
Dept: OBSTETRICS AND GYNECOLOGY | Facility: CLINIC | Age: 30
End: 2025-02-26
Payer: MEDICAID

## 2025-02-26 VITALS
HEART RATE: 76 BPM | WEIGHT: 216.38 LBS | SYSTOLIC BLOOD PRESSURE: 125 MMHG | BODY MASS INDEX: 34.93 KG/M2 | DIASTOLIC BLOOD PRESSURE: 76 MMHG

## 2025-02-26 DIAGNOSIS — O16.9 HYPERTENSION AFFECTING PREGNANCY, ANTEPARTUM: ICD-10-CM

## 2025-02-26 DIAGNOSIS — Z34.83 ENCOUNTER FOR SUPERVISION OF NORMAL PREGNANCY IN MULTIGRAVIDA IN THIRD TRIMESTER: ICD-10-CM

## 2025-02-26 DIAGNOSIS — Z34.83 ENCOUNTER FOR SUPERVISION OF NORMAL PREGNANCY IN MULTIGRAVIDA IN THIRD TRIMESTER: Primary | ICD-10-CM

## 2025-02-26 DIAGNOSIS — Z98.891 HISTORY OF C-SECTION: ICD-10-CM

## 2025-02-26 NOTE — PROGRESS NOTES
CC: Follow-Up OB    HPI:   29 y.o.  at 33w4d with EDC of 2025, by Ultrasound, here for her follow up OB visit.  Denies any complaints.    Pregnancy ROS:  Positive fetal movement   Negative leakage of fluid   Negative vaginal bleeding   Negative headache, vision changes, RUQ pain, epigastric pain  Negative contractions/abdominal pain   Negative pelvic pressure     Physical Exam:  Prenatal Vitals  BP: 125/76  Weight: 98.2 kg (216 lb 6.4 oz)  Fetal Heart Rate: 130s  Urine Glucose: Negative  Urine Albumin: Negative    Wt Readings from Last 3 Encounters:   25 98.2 kg (216 lb 6.4 oz)   25 97.9 kg (215 lb 12.8 oz)   25 97.5 kg (215 lb)       Body mass index is 34.93 kg/m².    General: NAD, well developed, well nourished  Psych: alert and oriented to person, time and place, normal affect  HEENT: normocephalic, atraumatic  Abd: Gravid, soft, NT, ND  Skin: warm, dry  Neuro: normal gait, gross motor function intact  No cyanosis, clubbing or edema    FHTs: 130s  FH: AGA    Maternal Blood Type: O POS    BPP  JANUARY 13.6 cm  Cephalic    8/8    ASSESSMENT: 29 y.o.  @ 33w4d with   1. Encounter for supervision of normal pregnancy in multigravida in third trimester    2. Hypertension affecting pregnancy, antepartum    3. History of          PLAN:  Encounter for supervision of normal pregnancy in multigravida in third trimester    Hypertension affecting pregnancy, antepartum  -     US OB/GYN Procedure ([facility]); Future    History of        Weekly BPPS  Pain, fever, bleeding precautions  Labor, Fetal movement, and Preeclampsia precautions  Cont PNV/labetalol/asa  Return to clinic in 1 week

## 2025-03-05 ENCOUNTER — PROCEDURE VISIT (OUTPATIENT)
Dept: OBSTETRICS AND GYNECOLOGY | Facility: CLINIC | Age: 30
End: 2025-03-05
Payer: MEDICAID

## 2025-03-05 ENCOUNTER — PATIENT MESSAGE (OUTPATIENT)
Dept: OBSTETRICS AND GYNECOLOGY | Facility: CLINIC | Age: 30
End: 2025-03-05

## 2025-03-05 ENCOUNTER — ROUTINE PRENATAL (OUTPATIENT)
Dept: OBSTETRICS AND GYNECOLOGY | Facility: CLINIC | Age: 30
End: 2025-03-05
Payer: MEDICAID

## 2025-03-05 VITALS
HEART RATE: 97 BPM | WEIGHT: 214.38 LBS | BODY MASS INDEX: 34.61 KG/M2 | DIASTOLIC BLOOD PRESSURE: 83 MMHG | SYSTOLIC BLOOD PRESSURE: 130 MMHG

## 2025-03-05 DIAGNOSIS — O16.9 HYPERTENSION AFFECTING PREGNANCY, ANTEPARTUM: ICD-10-CM

## 2025-03-05 DIAGNOSIS — Z34.83 ENCOUNTER FOR SUPERVISION OF NORMAL PREGNANCY IN MULTIGRAVIDA IN THIRD TRIMESTER: Primary | ICD-10-CM

## 2025-03-05 PROCEDURE — 76819 FETAL BIOPHYS PROFIL W/O NST: CPT | Mod: 26,S$PBB,, | Performed by: STUDENT IN AN ORGANIZED HEALTH CARE EDUCATION/TRAINING PROGRAM

## 2025-03-05 NOTE — PROGRESS NOTES
CC: Follow-Up OB    HPI:   29 y.o.  at 34w4d with EDC of 2025, by Ultrasound, here for her follow up OB visit.  Denies any complaints.    Pregnancy ROS:  Positive fetal movement   Negative leakage of fluid   Negative vaginal bleeding   Negative headache, vision changes, RUQ pain, epigastric pain  Negative contractions/abdominal pain   Negative pelvic pressure     Physical Exam:  Prenatal Vitals  BP: 130/83  Weight: 97.3 kg (214 lb 6.4 oz)  Fetal Heart Rate: 133 by u/s  Urine Glucose: Negative  Urine Albumin: Negative    Wt Readings from Last 3 Encounters:   25 97.3 kg (214 lb 6.4 oz)   25 98.2 kg (216 lb 6.4 oz)   25 97.9 kg (215 lb 12.8 oz)       Body mass index is 34.61 kg/m².    General: NAD, well developed, well nourished  Psych: alert and oriented to person, time and place, normal affect  HEENT: normocephalic, atraumatic  Abd: Gravid, soft, NT, ND  Skin: warm, dry  Neuro: normal gait, gross motor function intact  No cyanosis, clubbing or edema    FHTs: 133 by u/s   FH: AGA    Maternal Blood Type: O POS    BPP  Cephalic    JANUARY 12.6      ASSESSMENT: 29 y.o.  @ 34w4d with   1. Encounter for supervision of normal pregnancy in multigravida in third trimester    2. Hypertension affecting pregnancy, antepartum         PLAN:  Encounter for supervision of normal pregnancy in multigravida in third trimester    Hypertension affecting pregnancy, antepartum    Weekly BPPS   Pain, fever, bleeding precautions  Labor, Fetal movement, and Preeclampsia precautions  Cont PNV/ASA/labetalol   Return to clinic in 1 week with Dr Mckeon

## 2025-03-08 ENCOUNTER — PATIENT MESSAGE (OUTPATIENT)
Dept: OTHER | Facility: OTHER | Age: 30
End: 2025-03-08
Payer: MEDICAID

## 2025-03-13 ENCOUNTER — PROCEDURE VISIT (OUTPATIENT)
Dept: OBSTETRICS AND GYNECOLOGY | Facility: CLINIC | Age: 30
End: 2025-03-13
Payer: MEDICAID

## 2025-03-13 ENCOUNTER — ROUTINE PRENATAL (OUTPATIENT)
Dept: OBSTETRICS AND GYNECOLOGY | Facility: CLINIC | Age: 30
End: 2025-03-13
Payer: MEDICAID

## 2025-03-13 VITALS — WEIGHT: 214 LBS | DIASTOLIC BLOOD PRESSURE: 79 MMHG | BODY MASS INDEX: 34.54 KG/M2 | SYSTOLIC BLOOD PRESSURE: 123 MMHG

## 2025-03-13 DIAGNOSIS — O36.5990 FETAL GROWTH RESTRICTION ANTEPARTUM: ICD-10-CM

## 2025-03-13 DIAGNOSIS — O99.340 ANXIETY DURING PREGNANCY: ICD-10-CM

## 2025-03-13 DIAGNOSIS — O09.93 SUPERVISION OF HIGH RISK PREGNANCY IN THIRD TRIMESTER: Primary | ICD-10-CM

## 2025-03-13 DIAGNOSIS — Z3A.35 35 WEEKS GESTATION OF PREGNANCY: ICD-10-CM

## 2025-03-13 DIAGNOSIS — O16.9 HYPERTENSION AFFECTING PREGNANCY, ANTEPARTUM: Primary | ICD-10-CM

## 2025-03-13 DIAGNOSIS — O10.919 HTN IN PREGNANCY, CHRONIC: ICD-10-CM

## 2025-03-13 DIAGNOSIS — F41.9 ANXIETY DURING PREGNANCY: ICD-10-CM

## 2025-03-13 DIAGNOSIS — O16.9 HYPERTENSION AFFECTING PREGNANCY, ANTEPARTUM: ICD-10-CM

## 2025-03-13 PROCEDURE — 99214 OFFICE O/P EST MOD 30 MIN: CPT | Mod: S$PBB,TH,, | Performed by: STUDENT IN AN ORGANIZED HEALTH CARE EDUCATION/TRAINING PROGRAM

## 2025-03-13 RX ORDER — SERTRALINE HYDROCHLORIDE 25 MG/1
25 TABLET, FILM COATED ORAL
Qty: 30 TABLET | Refills: 1 | Status: SHIPPED | OUTPATIENT
Start: 2025-03-13

## 2025-03-13 NOTE — PROGRESS NOTES
CC: Follow-Up OB    HPI:   29 y.o.  at 35w5d with EDC of 2025, by Ultrasound, here for her follow up OB visit. Complains of mild pressure.    Pregnancy ROS:  Positive fetal movement   Negative leakage of fluid   Negative vaginal bleeding   Negative headache, vision changes, RUQ pain, epigastric pain  Negative contractions/abdominal pain   Negative pelvic pressure     Physical Exam:  Prenatal Vitals  BP: 123/79  Weight: 97.1 kg (214 lb)    Wt Readings from Last 3 Encounters:   25 97.1 kg (214 lb)   25 97.3 kg (214 lb 6.4 oz)   25 98.2 kg (216 lb 6.4 oz)     Body mass index is 34.54 kg/m².    General: NAD, well developed, well nourished  Psych: alert and oriented to person, time and place, normal affect  HEENT: normocephalic, atraumatic  Abd: Gravid, soft, NT, ND  Skin: warm, dry  Neuro: normal gait, gross motor function intact  No cyanosis, clubbing or edema    FHTs: + on US    Maternal Blood Type: O POS    ASSESSMENT: 29 y.o.  @ 35w5d with   Problem List[1]    PLAN:  Rpt C/s scheduled for 3/24  Continue weekly BPPs,  today  Growth scan today, FGR now, EFW - 7%, dopplers wnl  Continue ASA daily, labetalol   Pain, fever, bleeding precautions  SHIV, VIVIEN, PreE precautions  Cont PNV, Iron  Return to clinic in 1 weeks           [1]   Patient Active Problem List  Diagnosis    History of     Abnormal uterine bleeding (AUB)    Encounter for supervision of normal pregnancy in first trimester

## 2025-03-14 ENCOUNTER — PATIENT MESSAGE (OUTPATIENT)
Dept: OBSTETRICS AND GYNECOLOGY | Facility: CLINIC | Age: 30
End: 2025-03-14
Payer: MEDICAID

## 2025-03-18 DIAGNOSIS — O10.919 HTN IN PREGNANCY, CHRONIC: Primary | ICD-10-CM

## 2025-03-20 ENCOUNTER — PROCEDURE VISIT (OUTPATIENT)
Dept: OBSTETRICS AND GYNECOLOGY | Facility: CLINIC | Age: 30
End: 2025-03-20
Payer: MEDICAID

## 2025-03-20 ENCOUNTER — ROUTINE PRENATAL (OUTPATIENT)
Dept: OBSTETRICS AND GYNECOLOGY | Facility: CLINIC | Age: 30
End: 2025-03-20
Payer: MEDICAID

## 2025-03-20 VITALS
SYSTOLIC BLOOD PRESSURE: 135 MMHG | BODY MASS INDEX: 34.98 KG/M2 | HEART RATE: 88 BPM | DIASTOLIC BLOOD PRESSURE: 80 MMHG | WEIGHT: 216.69 LBS

## 2025-03-20 DIAGNOSIS — Z3A.36 36 WEEKS GESTATION OF PREGNANCY: ICD-10-CM

## 2025-03-20 DIAGNOSIS — O10.919 HTN IN PREGNANCY, CHRONIC: ICD-10-CM

## 2025-03-20 DIAGNOSIS — O09.93 SUPERVISION OF HIGH RISK PREGNANCY IN THIRD TRIMESTER: Primary | ICD-10-CM

## 2025-03-20 PROCEDURE — 99214 OFFICE O/P EST MOD 30 MIN: CPT | Mod: S$PBB,TH,, | Performed by: STUDENT IN AN ORGANIZED HEALTH CARE EDUCATION/TRAINING PROGRAM

## 2025-03-20 RX ORDER — ALBUTEROL SULFATE 0.83 MG/ML
SOLUTION RESPIRATORY (INHALATION)
COMMUNITY
Start: 2024-12-17

## 2025-03-20 NOTE — PROGRESS NOTES
CC: Follow-Up OB    HPI:   29 y.o.  at 36w5d with EDC of 2025, by Ultrasound, here for her follow up OB visit. Complains of LE swelling.    Pregnancy ROS:  Positive fetal movement   Negative leakage of fluid   Negative vaginal bleeding   Negative headache, vision changes, RUQ pain, epigastric pain  Negative contractions/abdominal pain   Negative pelvic pressure     Physical Exam:  Prenatal Vitals  BP: 135/80  Weight: 98.3 kg (216 lb 11.2 oz)  Fetal Heart Rate: 153    Wt Readings from Last 3 Encounters:   25 98.3 kg (216 lb 11.2 oz)   25 97.1 kg (214 lb)   25 97.3 kg (214 lb 6.4 oz)       Body mass index is 34.98 kg/m².    General: NAD, well developed, well nourished  Psych: alert and oriented to person, time and place, normal affect  HEENT: normocephalic, atraumatic  Abd: Gravid, soft, NT, ND  Skin: warm, dry  Neuro: normal gait, gross motor function intact  No cyanosis, clubbing, + edema    FHTs: + on US    Maternal Blood Type: O POS    ASSESSMENT: 29 y.o.  @ 36w5d with   Problem List[1]    PLAN:  RCS with BTL    Rpt C/s scheduled for 3/24  Continue weekly BPPs,  dopplers wnl  Pain, fever, bleeding precautions  SHIV, VIVIEN, PreE precautions  Cont PNV, Iron  Return to clinic in 1 weeks           [1]   Patient Active Problem List  Diagnosis    History of     Abnormal uterine bleeding (AUB)    Encounter for supervision of normal pregnancy in first trimester    HTN in pregnancy, chronic    Fetal growth restriction antepartum

## 2025-03-24 ENCOUNTER — OUTSIDE PLACE OF SERVICE (OUTPATIENT)
Dept: OBSTETRICS AND GYNECOLOGY | Facility: CLINIC | Age: 30
End: 2025-03-24
Payer: MEDICAID

## 2025-03-24 PROCEDURE — 59514 CESAREAN DELIVERY ONLY: CPT | Mod: AT,,, | Performed by: STUDENT IN AN ORGANIZED HEALTH CARE EDUCATION/TRAINING PROGRAM

## 2025-03-24 PROCEDURE — 58611 LIGATE OVIDUCT(S) ADD-ON: CPT | Mod: ,,, | Performed by: STUDENT IN AN ORGANIZED HEALTH CARE EDUCATION/TRAINING PROGRAM

## 2025-03-25 PROCEDURE — 99231 SBSQ HOSP IP/OBS SF/LOW 25: CPT | Mod: ,,, | Performed by: STUDENT IN AN ORGANIZED HEALTH CARE EDUCATION/TRAINING PROGRAM

## 2025-03-26 PROCEDURE — 99238 HOSP IP/OBS DSCHRG MGMT 30/<: CPT | Mod: ,,, | Performed by: STUDENT IN AN ORGANIZED HEALTH CARE EDUCATION/TRAINING PROGRAM

## 2025-03-27 ENCOUNTER — PATIENT MESSAGE (OUTPATIENT)
Dept: OBSTETRICS AND GYNECOLOGY | Facility: CLINIC | Age: 30
End: 2025-03-27

## 2025-03-31 ENCOUNTER — PATIENT MESSAGE (OUTPATIENT)
Dept: OBSTETRICS AND GYNECOLOGY | Facility: CLINIC | Age: 30
End: 2025-03-31
Payer: MEDICAID

## 2025-03-31 DIAGNOSIS — Z09 FOLLOW-UP EXAMINATION, FOLLOWING OTHER SURGERY: Primary | ICD-10-CM

## 2025-03-31 RX ORDER — IBUPROFEN 800 MG/1
800 TABLET ORAL 3 TIMES DAILY
Qty: 30 TABLET | Refills: 0 | Status: SHIPPED | OUTPATIENT
Start: 2025-03-31

## 2025-04-01 ENCOUNTER — POSTPARTUM VISIT (OUTPATIENT)
Dept: OBSTETRICS AND GYNECOLOGY | Facility: CLINIC | Age: 30
End: 2025-04-01
Payer: MEDICAID

## 2025-04-01 ENCOUNTER — PATIENT MESSAGE (OUTPATIENT)
Dept: OBSTETRICS AND GYNECOLOGY | Facility: CLINIC | Age: 30
End: 2025-04-01

## 2025-04-01 VITALS
SYSTOLIC BLOOD PRESSURE: 125 MMHG | HEART RATE: 62 BPM | BODY MASS INDEX: 32.6 KG/M2 | DIASTOLIC BLOOD PRESSURE: 80 MMHG | WEIGHT: 202 LBS

## 2025-04-01 DIAGNOSIS — Z98.891 STATUS POST C-SECTION: Primary | ICD-10-CM

## 2025-04-01 DIAGNOSIS — Z90.79 STATUS POST BILATERAL SALPINGECTOMY: ICD-10-CM

## 2025-04-01 PROCEDURE — 99024 POSTOP FOLLOW-UP VISIT: CPT | Mod: ,,, | Performed by: STUDENT IN AN ORGANIZED HEALTH CARE EDUCATION/TRAINING PROGRAM

## 2025-04-01 NOTE — PROGRESS NOTES
Chief Complaint: post op    HPI: Patient is a 29 y.o. y.o. female  who presents to GYN clinic for follow up.  Patient is status post repeat  with bilateral salpingectom present clinic for 1 week postop visit.  She is doing well today.  She does report appropriate pain control medication.  Minimal vaginal bleeding.  She is ambulating, voiding, tolerating p.o. diet without difficulty.  She has no other complaints today.  Review of systems negative unless mentioned above..    Physical Exam:  Vitals:    25 1007   BP: 125/80   Pulse: 62   Weight: 91.6 kg (202 lb)     Gen: NAD, well developed, well nourished  Psych: alert and oriented x 3, normal affect  HEENT: normocephalic, atraumatic  Abd: soft, non-tender, non-distended, Pfannenstiel incision healing well, no signs of infection  Skin: warm and dry  Ext: no c/c/c, moves all extremities  Neurological: normal gait, gross motor function intact    Assessment: Patient is a 29 y.o. y.o. female  with  Problem List[1]    Plan:  Patient doing well postop   Continue postop restrictions  Continue postop wound care   Pathology reviewed patient, patient voices understanding   Patient return to clinic in 5 weeks for her 6 week postpartum visit    Lio Mckeon MD             [1]   Patient Active Problem List  Diagnosis    History of     Abnormal uterine bleeding (AUB)    Encounter for supervision of normal pregnancy in first trimester    HTN in pregnancy, chronic    Fetal growth restriction antepartum

## 2025-04-10 ENCOUNTER — PATIENT MESSAGE (OUTPATIENT)
Dept: OBSTETRICS AND GYNECOLOGY | Facility: CLINIC | Age: 30
End: 2025-04-10
Payer: MEDICAID

## 2025-05-06 ENCOUNTER — POSTPARTUM VISIT (OUTPATIENT)
Dept: OBSTETRICS AND GYNECOLOGY | Facility: CLINIC | Age: 30
End: 2025-05-06
Payer: MEDICAID

## 2025-05-06 VITALS — WEIGHT: 201 LBS | BODY MASS INDEX: 32.44 KG/M2 | SYSTOLIC BLOOD PRESSURE: 127 MMHG | DIASTOLIC BLOOD PRESSURE: 83 MMHG

## 2025-05-06 DIAGNOSIS — Z90.79 STATUS POST BILATERAL SALPINGECTOMY: ICD-10-CM

## 2025-05-06 DIAGNOSIS — Z98.891 STATUS POST C-SECTION: Primary | ICD-10-CM

## 2025-05-06 DIAGNOSIS — F41.9 ANXIETY DURING PREGNANCY: ICD-10-CM

## 2025-05-06 DIAGNOSIS — O99.340 ANXIETY DURING PREGNANCY: ICD-10-CM

## 2025-05-06 PROBLEM — Z34.91 ENCOUNTER FOR SUPERVISION OF NORMAL PREGNANCY IN FIRST TRIMESTER: Status: RESOLVED | Noted: 2024-10-01 | Resolved: 2025-05-06

## 2025-05-06 PROBLEM — O36.5990 FETAL GROWTH RESTRICTION ANTEPARTUM: Status: RESOLVED | Noted: 2025-03-13 | Resolved: 2025-05-06

## 2025-05-06 RX ORDER — SERTRALINE HYDROCHLORIDE 25 MG/1
50 TABLET, FILM COATED ORAL DAILY
Qty: 60 TABLET | Refills: 6 | Status: SHIPPED | OUTPATIENT
Start: 2025-05-06

## 2025-05-06 RX ORDER — SERTRALINE HYDROCHLORIDE 25 MG/1
50 TABLET, FILM COATED ORAL DAILY
Qty: 30 TABLET | Refills: 6 | Status: SHIPPED | OUTPATIENT
Start: 2025-05-06 | End: 2025-05-06

## 2025-05-06 NOTE — PROGRESS NOTES
Patient is a 29-year-old white female status post repeat  with bilateral salpingectomy, presents to clinic today for 6 week postpartum visit.  She is doing well today.  She is breast and formula feeding without difficulty.  She reports she has not had a menstrual cycle since her delivery.  She is currently taking her anxiety medication, she is requesting an increase in the dosage.  She has no other complaints today.  She denies VB/VD/dysuria/Denies any HA, vision changes, F/C, LE swelling. Denies any breast pain/soreness. Denies postpartum depression.     Vitals:    25 0935   BP: 127/83   Weight: 91.2 kg (201 lb)     Body mass index is 32.44 kg/m².  Gen:WDWN in NAD  NC/AT  Non labored breathing   Abd soft, NT/ND, Pfannenstiel incision well healed  Skin: warm and dry  Ext no edema    Problem List[1]    Plan   Patient doing well postop   Patient released from postop restrictions   She had bilateral salpingectomy for contraception   Will increase Zoloft dose to 50 mg daily  RTC 1 yr annual /PRN           [1]   Patient Active Problem List  Diagnosis    History of     Abnormal uterine bleeding (AUB)    HTN in pregnancy, chronic    Status post bilateral salpingectomy

## 2025-07-03 ENCOUNTER — PATIENT MESSAGE (OUTPATIENT)
Dept: OBSTETRICS AND GYNECOLOGY | Facility: CLINIC | Age: 30
End: 2025-07-03
Payer: MEDICAID

## 2025-07-07 ENCOUNTER — PATIENT MESSAGE (OUTPATIENT)
Dept: OBSTETRICS AND GYNECOLOGY | Facility: CLINIC | Age: 30
End: 2025-07-07

## 2025-07-07 ENCOUNTER — OFFICE VISIT (OUTPATIENT)
Dept: OBSTETRICS AND GYNECOLOGY | Facility: CLINIC | Age: 30
End: 2025-07-07
Payer: MEDICAID

## 2025-07-07 VITALS
DIASTOLIC BLOOD PRESSURE: 77 MMHG | HEART RATE: 105 BPM | SYSTOLIC BLOOD PRESSURE: 116 MMHG | WEIGHT: 207.5 LBS | HEIGHT: 66 IN | BODY MASS INDEX: 33.35 KG/M2

## 2025-07-07 DIAGNOSIS — N76.0 BV (BACTERIAL VAGINOSIS): ICD-10-CM

## 2025-07-07 DIAGNOSIS — F41.9 ANXIETY: Primary | ICD-10-CM

## 2025-07-07 DIAGNOSIS — B96.89 BV (BACTERIAL VAGINOSIS): ICD-10-CM

## 2025-07-07 PROCEDURE — 3008F BODY MASS INDEX DOCD: CPT | Mod: CPTII,,, | Performed by: NURSE PRACTITIONER

## 2025-07-07 PROCEDURE — 1160F RVW MEDS BY RX/DR IN RCRD: CPT | Mod: CPTII,,, | Performed by: NURSE PRACTITIONER

## 2025-07-07 PROCEDURE — 3074F SYST BP LT 130 MM HG: CPT | Mod: CPTII,,, | Performed by: NURSE PRACTITIONER

## 2025-07-07 PROCEDURE — 3078F DIAST BP <80 MM HG: CPT | Mod: CPTII,,, | Performed by: NURSE PRACTITIONER

## 2025-07-07 PROCEDURE — 99213 OFFICE O/P EST LOW 20 MIN: CPT | Mod: S$PBB,,, | Performed by: NURSE PRACTITIONER

## 2025-07-07 PROCEDURE — 1159F MED LIST DOCD IN RCRD: CPT | Mod: CPTII,,, | Performed by: NURSE PRACTITIONER

## 2025-07-07 RX ORDER — METRONIDAZOLE 7.5 MG/G
1 GEL VAGINAL NIGHTLY
Qty: 70 G | Refills: 0 | Status: SHIPPED | OUTPATIENT
Start: 2025-07-07 | End: 2025-07-12

## 2025-07-07 RX ORDER — HYDROXYZINE PAMOATE 50 MG/1
50 CAPSULE ORAL EVERY 8 HOURS PRN
Qty: 30 CAPSULE | Refills: 3 | Status: SHIPPED | OUTPATIENT
Start: 2025-07-07

## 2025-07-07 NOTE — PROGRESS NOTES
"  CC: Vaginal discharge    Johanna Iverson is a 30 y.o. female  presents with complaint of vaginal discharge with odor for the last couple of weeks. Also reports occasional anxiety attacks.       Past Medical History:   Diagnosis Date    ADHD     Anxiety disorder, unspecified     Asthma     Gestational diabetes mellitus (GDM) affecting third pregnancy     Gestational hypertension 2021     Past Surgical History:   Procedure Laterality Date     SECTION      x 2     Social History[1]  Family History   Problem Relation Name Age of Onset    No Known Problems Paternal Grandfather      Lung cancer Paternal Grandmother      Hypertension Father      No Known Problems Mother      Breast cancer Maternal Aunt  29     OB History    Para Term  AB Living   4 3 2   3   SAB IAB Ectopic Multiple Live Births       3      # Outcome Date GA Lbr Miquel/2nd Weight Sex Type Anes PTL Lv   4             3 Term 21 37w0d  2.88 kg (6 lb 5.6 oz) M CS-LTranv   JOHANNA      Complications: Gestational HTN   2 Para 19    M CS-LTranv  N JOHANNA   1 Term 10/13/15   2.92 kg (6 lb 7 oz) M CS-LTranv Spinal N JOHANNA      Complications: Breech birth         ROS:  GENERAL: No fever, chills, fatigability or weight loss.  VULVAR: No pain, no lesions and no itching.  VAGINAL: No relaxation, no itching, +discharge, + odor no abnormal bleeding and no lesions.  ABDOMEN: No abdominal pain. Denies nausea. Denies vomiting. No diarrhea. No constipation  BREAST: Denies pain. No lumps. No discharge.  URINARY: No incontinence, no nocturia, no frequency and no dysuria.  CARDIOVASCULAR: No chest pain. No shortness of breath. No leg cramps.  NEUROLOGICAL: No headaches. No vision changes.      /77   Pulse 105   Ht 5' 6" (1.676 m)   Wt 94.1 kg (207 lb 8 oz)   LMP  (LMP Unknown)   Breastfeeding Yes   BMI 33.49 kg/m²     PHYSICAL EXAM:  VULVA: normal appearing vulva with no masses, tenderness or lesions, VAGINA: vaginal " discharge - white, copious, and creamy, CERVIX: normal appearing cervix without discharge or lesions, WET MOUNT done - results: clue cells    ASSESSMENT and PLAN:    ICD-10-CM ICD-9-CM    1. Anxiety  F41.9 300.00 hydrOXYzine pamoate (VISTARIL) 50 MG Cap      2. BV (bacterial vaginosis)  N76.0 616.10 metroNIDAZOLE (METROGEL) 0.75 % (37.5mg/5 gram) vaginal gel    B96.89 041.9             Patient was counseled today on vaginitis prevention including :  a. avoiding feminine products such as deoderant soaps, body wash, bubble bath, douches, scented toilet paper, deoderant tampons or pads, feminine wipes, chronic pad use, etc.  b. avoiding other vulvovaginal irritants such as long hot baths, humidity, tight, synthetic clothing, chlorine and sitting around in wet bathing suits  c. wearing cotton underwear, avoiding thong underwear and no underwear to bed  d. taking showers instead of baths and use a hair dryer on cool setting afterwards to dry  e. wearing cotton to exercise and shower immediately after exercise and change clothes  f. using polyurethane condoms without spermicide if sexually active and symptoms are triggered by intercourse    FOLLOW UP: PRN lack of improvement.          [1]   Social History  Tobacco Use    Smoking status: Never    Smokeless tobacco: Never   Substance Use Topics    Alcohol use: Never    Drug use: Never